# Patient Record
Sex: MALE | Race: WHITE | Employment: OTHER | URBAN - METROPOLITAN AREA
[De-identification: names, ages, dates, MRNs, and addresses within clinical notes are randomized per-mention and may not be internally consistent; named-entity substitution may affect disease eponyms.]

---

## 2017-03-07 RX ORDER — ZOLPIDEM TARTRATE 5 MG/1
5 TABLET ORAL
Qty: 30 TAB | Refills: 0 | Status: SHIPPED | OUTPATIENT
Start: 2017-03-07 | End: 2017-08-12 | Stop reason: SDUPTHER

## 2017-03-07 NOTE — TELEPHONE ENCOUNTER
From: Kia Miranda Sr  To: Abby Garcia MD  Sent: 3/7/2017 10:24 AM EST  Subject: Medication Renewal Request    Original authorizing provider: MD Kia Morales Sr would like a refill of the following medications:  zolpidem (AMBIEN) 5 mg tablet Abby Garcia MD]    Preferred pharmacy: [address]    Comment:

## 2017-03-08 ENCOUNTER — TELEPHONE (OUTPATIENT)
Dept: INTERNAL MEDICINE CLINIC | Age: 72
End: 2017-03-08

## 2017-03-08 NOTE — TELEPHONE ENCOUNTER
Chuy Kim from [address] [place] [address] needs a call back in ref to the script that was faxed the script has void all over

## 2017-08-10 RX ORDER — ATORVASTATIN CALCIUM 10 MG/1
TABLET, FILM COATED ORAL
Qty: 90 TAB | Refills: 1 | Status: SHIPPED | OUTPATIENT
Start: 2017-08-10 | End: 2018-02-06 | Stop reason: SDUPTHER

## 2017-08-14 RX ORDER — ZOLPIDEM TARTRATE 5 MG/1
5 TABLET ORAL
Qty: 30 TAB | Refills: 5 | Status: SHIPPED | OUTPATIENT
Start: 2017-08-14 | End: 2018-04-04 | Stop reason: SDUPTHER

## 2017-08-14 NOTE — TELEPHONE ENCOUNTER
From: Sebastien Bates Sr  To: Lebron Callas, MD  Sent: 8/12/2017 4:24 PM EDT  Subject: Medication Renewal Request    Original authorizing provider: Lebron Callas, MD    [facility].  Shyam Andres would like a refill of the following medications:  zolpidem (AMBIEN) 5 mg tablet Lebron Callas, MD]    Preferred pharmacy: [address], [address] [place] [address]    Comment:

## 2017-08-22 ENCOUNTER — TELEPHONE (OUTPATIENT)
Dept: INTERNAL MEDICINE CLINIC | Age: 72
End: 2017-08-22

## 2017-08-23 ENCOUNTER — TELEPHONE (OUTPATIENT)
Dept: INTERNAL MEDICINE CLINIC | Age: 72
End: 2017-08-23

## 2017-08-23 NOTE — TELEPHONE ENCOUNTER
Writer has received approval for [facility] and fax to Eve. Approval dates: 6/23/17 to 8/22/18.

## 2017-08-30 ENCOUNTER — HOSPITAL ENCOUNTER (OUTPATIENT)
Dept: LAB | Age: 72
Discharge: HOME | End: 2017-08-30
Payer: MEDICARE

## 2017-08-30 ENCOUNTER — OFFICE VISIT (OUTPATIENT)
Dept: INTERNAL MEDICINE CLINIC | Age: 72
End: 2017-08-30

## 2017-08-30 VITALS
WEIGHT: 169 LBS | TEMPERATURE: 97 F | OXYGEN SATURATION: 98 % | BODY MASS INDEX: 21.69 KG/M2 | HEIGHT: 74 IN | SYSTOLIC BLOOD PRESSURE: 132 MMHG | HEART RATE: 64 BPM | RESPIRATION RATE: 16 BRPM | DIASTOLIC BLOOD PRESSURE: 82 MMHG
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
## 2017-08-30 PROCEDURE — 36415 COLL VENOUS BLD VENIPUNCTURE: CPT

## 2017-08-30 PROCEDURE — 85025 COMPLETE CBC W/AUTO DIFF WBC: CPT

## 2017-08-30 PROCEDURE — 86618 LYME DISEASE ANTIBODY: CPT

## 2017-08-30 NOTE — PROGRESS NOTES
Chief Complaint   Patient presents with    Insect Bite     x 1 month tick bite, feeling weakness and dizziness, fatigue       Had bite right lat ankle 4 weeks ago never saw tick was in [place] had been in [place] the week before no bites then gricelda has resolved  No rash  Started malaise 2 weeks ago   No flu symptoms  Sleep erratic but has been for years  Does snore some    Patient Active Problem List    Diagnosis    Prehypertension    Dyslipidemia, goal LDL below 100     Off statin since June 2014.l        Schwannoma of spinal cord ([facility] [address]     resected  t11      Anxiety associated with depression    Prostate cancer [facility])     prostatectomy 9/2011      PSA low  Cancer free  At 2015      Incidental lung nodule, > 3mm and < 8mm     4 mm    Repeat CT 2012      Pulmonary nodule     f/u ct 6 months      Anxiety     Anxious  Vitals:    08/30/17 1024 08/30/17 1059   BP: 144/80 132/82   Pulse: 64    Resp: 16    Temp: 97 °F (36.1 °C)    TempSrc: Oral    SpO2: 98%    Weight: 169 lb (76.7 kg)    Height: 6' 2\" (1.88 m)      no apparent distress  Right ankle bite gricelda is almost gone        Most likely no issue  Since 4 weeks and had armani on [place] the week before will do lyme and cbc  obed a p[ossibility      Diagnoses and all orders for this visit:    1.  Tick bite, initial encounter  -     LYME AB TOTAL W/RFLX W BLOT  -     CBC WITH AUTOMATED DIFF      He will call 2 weeks with status

## 2017-08-30 NOTE — PROGRESS NOTES
Coordination of Care Questions    1. Have you been to the ER, urgent care clinic since your last visit? No       Hospitalized since your last visit? No    2. Have you seen or consulted any other health care providers outside of the [address] [place] [address] since your last visit? Include any pap smears or colon screening.  Yes Opthalmologic, yearly exam, August

## 2017-08-31 LAB
B BURGDOR IGG+IGM SER-ACNC: <0.91 ISR (ref 0–0.9)
BASOPHILS # BLD AUTO: 0.1 X10E3/UL (ref 0–0.2)
BASOPHILS NFR BLD AUTO: 1 %
EOSINOPHIL # BLD AUTO: 0.3 X10E3/UL (ref 0–0.4)
EOSINOPHIL NFR BLD AUTO: 4 %
ERYTHROCYTE [DISTWIDTH] IN BLOOD BY AUTOMATED COUNT: 13.3 % (ref 12.3–15.4)
HCT VFR BLD AUTO: 46.1 % (ref 37.5–51)
HGB BLD-MCNC: 15.5 G/DL (ref 12.6–17.7)
IMM GRANULOCYTES # BLD: 0 X10E3/UL (ref 0–0.1)
IMM GRANULOCYTES NFR BLD: 0 %
LYMPHOCYTES # BLD AUTO: 2 X10E3/UL (ref 0.7–3.1)
LYMPHOCYTES NFR BLD AUTO: 24 %
MCH RBC QN AUTO: 32.8 PG (ref 26.6–33)
MCHC RBC AUTO-ENTMCNC: 33.6 G/DL (ref 31.5–35.7)
MCV RBC AUTO: 98 FL (ref 79–97)
MONOCYTES # BLD AUTO: 0.7 X10E3/UL (ref 0.1–0.9)
MONOCYTES NFR BLD AUTO: 8 %
NEUTROPHILS # BLD AUTO: 5.2 X10E3/UL (ref 1.4–7)
NEUTROPHILS NFR BLD AUTO: 63 %
PLATELET # BLD AUTO: 343 X10E3/UL (ref 150–379)
RBC # BLD AUTO: 4.73 X10E6/UL (ref 4.14–5.8)
WBC # BLD AUTO: 8.3 X10E3/UL (ref 3.4–10.8)

## 2017-11-29 ENCOUNTER — OFFICE VISIT (OUTPATIENT)
Dept: INTERNAL MEDICINE CLINIC | Age: 72
End: 2017-11-29

## 2017-11-29 VITALS
HEART RATE: 69 BPM | WEIGHT: 165 LBS | OXYGEN SATURATION: 99 % | SYSTOLIC BLOOD PRESSURE: 140 MMHG | HEIGHT: 74 IN | DIASTOLIC BLOOD PRESSURE: 84 MMHG | BODY MASS INDEX: 21.17 KG/M2 | TEMPERATURE: 98 F | RESPIRATION RATE: 14 BRPM

## 2017-11-29 DIAGNOSIS — I10 ESSENTIAL HYPERTENSION: ICD-10-CM

## 2017-11-29 DIAGNOSIS — Z00.00 MEDICARE ANNUAL WELLNESS VISIT, SUBSEQUENT: ICD-10-CM

## 2017-11-29 DIAGNOSIS — E78.5 DYSLIPIDEMIA, GOAL LDL BELOW 100: ICD-10-CM

## 2017-11-29 DIAGNOSIS — M70.61 TROCHANTERIC BURSITIS OF RIGHT HIP: Primary | ICD-10-CM

## 2017-11-29 DIAGNOSIS — C61 PROSTATE CANCER (HCC): ICD-10-CM

## 2017-11-29 DIAGNOSIS — Z13.39 SCREENING FOR ALCOHOLISM: ICD-10-CM

## 2017-11-29 DIAGNOSIS — R53.82 CHRONIC FATIGUE: ICD-10-CM

## 2017-11-29 RX ORDER — CHOLECALCIFEROL (VITAMIN D3) 125 MCG
200 CAPSULE ORAL DAILY
COMMUNITY

## 2017-11-29 RX ORDER — GUAIFENESIN 100 MG/5ML
81 LIQUID (ML) ORAL DAILY
Qty: 100 TAB | Refills: 11
Start: 2017-11-29 | End: 2020-12-07 | Stop reason: ALTCHOICE

## 2017-11-29 RX ORDER — LISINOPRIL 10 MG/1
10 TABLET ORAL DAILY
Qty: 90 TAB | Refills: 1 | Status: SHIPPED | OUTPATIENT
Start: 2017-11-29 | End: 2019-11-19 | Stop reason: ALTCHOICE

## 2017-11-29 NOTE — PROGRESS NOTES
Coordination of Care Questions    1. Have you been to the ER, urgent care clinic since your last visit? No       Hospitalized since your last visit? No    2. Have you seen or consulted any other health care providers outside of the [address] [place] [address] since your last visit? Include any pap smears or colon screening.  No

## 2017-11-29 NOTE — PATIENT INSTRUCTIONS
Medicare Wellness Visit, Male    The best way to live healthy is to have a healthy lifestyle by eating a well-balanced diet, exercising regularly, limiting alcohol and stopping smoking. Regular physical exams and screening tests are another way to keep healthy. Preventive exams provided by your health care provider can find health problems before they become diseases or illnesses. Preventive services including immunizations, screening tests, monitoring and exams can help you take care of your own health. All people over age 72 should have a pneumovax  and and a prevnar shot to prevent pneumonia. These are once in a lifetime unless you and your provider decide differently. All people over 65 should have a yearly flu shot and a tetanus vaccine every 10 years. Screening for diabetes mellitus with a blood sugar test should be done every year. Glaucoma is a disease of the eye due to increased ocular pressure that can lead to blindness and it should be done every year by an eye professional.    Cardiovascular screening tests that check for elevated lipids (fatty part of blood) which can lead to heart disease and strokes should be done every 5 years. Colorectal screening that evaluates for blood or polyps in your colon should be done yearly as a stool test or every five years as a flexible sigmoidoscope or every 10 years as a colonoscopy up to age 76. Men up to age 76 may need a screening blood test for prostate cancer at certain intervals, depending on their personal and family history. This decision is between the patient and his provider. If you have been a smoker or had family history of abdominal aortic aneurysms, you and your provider may decide to schedule an ultrasound test of your aorta. Hepatitis C screening is also recommended for anyone born between [birth year] through [address]. A shingles vaccine is also recommended once in a lifetime after age 61.     Your Medicare Wellness Exam is recommended annually. Here is a list of your current Health Maintenance items with a due date:  Health Maintenance Due   Topic Date Due    Annual Well Visit  11/29/2017          Hip Bursitis: Exercises  Your Care Instructions  Here are some examples of typical rehabilitation exercises for your condition. Start each exercise slowly. Ease off the exercise if you start to have pain. Your doctor or physical therapist will tell you when you can start these exercises and which ones will work best for you. How to do the exercises  Hip rotator stretch    1. Lie on your back with both knees bent and your feet flat on the floor. 2. Put the ankle of your affected leg on your opposite thigh near your knee. 3. Use your hand to gently push your knee away from your body until you feel a gentle stretch around your hip. 4. Hold the stretch for 15 to 30 seconds. 5. Repeat 2 to 4 times. 6. Repeat steps 1 through 5, but this time use your hand to gently pull your knee toward your opposite shoulder. Iliotibial band stretch    1. Lean sideways against a wall. If you are not steady on your feet, hold on to a chair or counter. 2. Stand on the leg with the affected hip, with that leg close to the wall. Then cross your other leg in front of it. 3. Let your affected hip drop out to the side of your body and against wall. Then lean away from your affected hip until you feel a stretch. 4. Hold the stretch for 15 to 30 seconds. 5. Repeat 2 to 4 times. Straight-leg raises to the outside    1. Lie on your side, with your affected hip on top. 2. Tighten the front thigh muscles of your top leg to keep your knee straight. 3. Keep your hip and your leg straight in line with the rest of your body, and keep your knee pointing forward. Do not drop your hip back. 4. Lift your top leg straight up toward the ceiling, about 12 inches off the floor. Hold for about 6 seconds, then slowly lower your leg.   5. Repeat 8 to 12 times.  Clamshell    1. Lie on your side, with your affected hip on top and your head propped on a pillow. Keep your feet and knees together and your knees bent. 2. Raise your top knee, but keep your feet together. Do not let your hips roll back. Your legs should open up like a clamshell. 3. Hold for 6 seconds. 4. Slowly lower your knee back down. Rest for 10 seconds. 5. Repeat 8 to 12 times. Follow-up care is a key part of your treatment and safety. Be sure to make and go to all appointments, and call your doctor if you are having problems. It's also a good idea to know your test results and keep a list of the medicines you take. Where can you learn more? Go to http://reuben-giovany.info/. Enter Q247 in the search box to learn more about \"Hip Bursitis: Exercises. \"  Current as of: March 21, 2017  Content Version: 11.4  © 2231-2216 Healthwise, Incorporated. Care instructions adapted under license by [employer] (which disclaims liability or warranty for this information). If you have questions about a medical condition or this instruction, always ask your healthcare professional. [place] [address] any warranty or liability for your use of this information.

## 2017-11-29 NOTE — MR AVS SNAPSHOT
Visit Information Date & Time Provider Department Dept. Phone Encounter #  
 11/29/2017  9:15 AM Daniel Barrientos MD [facility] [phone] [id number] Upcoming Health Maintenance Date Due  
 MEDICARE YEARLY EXAM 11/29/2017 COLONOSCOPY 7/1/2019 GLAUCOMA SCREENING Q2Y 8/18/2019 DTaP/Tdap/Td series (2 - Td) 9/14/2020 Allergies as of 11/29/2017  Review Complete On: 11/29/2017 By: Daniel Barrientos MD  
  
 Severity Noted Reaction Type Reactions Iodinated Contrast- Oral And Iv Dye  09/19/2011    Nausea and Vomiting Iodine  09/19/2011    Nausea and Vomiting Morphine  09/26/2011    Hives Pcn [Penicillins]  09/14/2010    Hives Current Immunizations  Reviewed on 11/29/2017 Name Date Influenza High Dose Vaccine PF 11/9/2017, 11/3/2016, 10/27/2015 Influenza Vaccine 11/13/2013 Influenza Vaccine (Quad) PF 11/17/2014 Influenza Vaccine Split 11/1/2012 TDAP Vaccine 9/14/2010 ZZZ-RETIRED (DO NOT USE) Pneumococcal Vaccine (Unspecified Type) 11/1/2012, 1/2/2003 Zoster 1/2/2009 Reviewed by Stephanie Espinal LPN on 56/27/0870 at  9:37 AM  
You Were Diagnosed With   
  
 Codes Comments Trochanteric bursitis of right hip    -  Primary ICD-10-CM: M70.61 ICD-9-CM: 726.5 Medicare annual wellness visit, subsequent     ICD-10-CM: Z00.00 ICD-9-CM: V70.0 Screening for alcoholism     ICD-10-CM: Z13.89 ICD-9-CM: V79.1 Dyslipidemia, goal LDL below 100     ICD-10-CM: E78.5 ICD-9-CM: 272.4 Essential hypertension     ICD-10-CM: I10 
ICD-9-CM: 401.9 Prostate cancer [facility])     ICD-10-CM: Y31 ICD-9-CM: 880 Chronic fatigue     ICD-10-CM: R53.82 
ICD-9-CM: 780.79 Vitals BP Pulse Temp Resp Height(growth percentile) Weight(growth percentile) 140/84 69 98 °F (36.7 °C) (Oral) 14 6' 2\" (1.88 m) 165 lb (74.8 kg) SpO2 BMI Smoking Status 99% 21.18 kg/m2 Former Smoker Vitals History BMI and BSA Data Body Mass Index Body Surface Area  
 21.18 kg/m 2 1.98 m 2 Preferred Pharmacy Pharmacy Name Phone [facility] [address] [place] [address], [address] [phone] Your Updated Medication List  
  
   
This list is accurate as of: 11/29/17 10:09 AM.  Always use your most recent med list.  
  
  
  
  
 aspirin 81 mg chewable tablet Take 1 Tab by mouth daily. atorvastatin 10 mg tablet Commonly known as:  LIPITOR  
TAKE ONE TABLET BY MOUTH DAILY  
  
 CO Q-10 100 mg capsule Generic drug:  co-enzyme Q-10 Take 200 mg by mouth daily. COSOPT OP Apply  to eye.  
  
 lisinopril 10 mg tablet Commonly known as:  Robertha Roup Take 1 Tab by mouth daily. Indications: hypertension PRESERVISION LUTEIN 226 mg-200 unit -5 mg-0.8 mg Cap Generic drug:  Vit C-Vit E-Copper-ZnOx-Lutein Take  by mouth daily. zolpidem 5 mg tablet Commonly known as:  AMBIEN Take 1 Tab by mouth nightly as needed for Sleep. Max Daily Amount: 5 mg. Prescriptions Sent to Pharmacy Refills  
 lisinopril (PRINIVIL, ZESTRIL) 10 mg tablet 1 Sig: Take 1 Tab by mouth daily. Indications: hypertension Class: Normal  
 Pharmacy: [facility], [facility] #: [phone] Route: Oral  
  
We Performed the Following LIPID PANEL [32822 CPT(R)] METABOLIC PANEL, COMPREHENSIVE [43893 CPT(R)] AL ANNUAL ALCOHOL SCREEN 15 MIN [id number] [facility]] PSA, DIAGNOSTIC (PROSTATE SPECIFIC AG) [id number] CPT(R)] TSH 3RD GENERATION [80834 CPT(R)] Patient Instructions Medicare Wellness Visit, Male The best way to live healthy is to have a healthy lifestyle by eating a well-balanced diet, exercising regularly, limiting alcohol and stopping smoking. Regular physical exams and screening tests are another way to keep healthy.   
Preventive exams provided by your health care provider can find health problems before they become diseases or illnesses. Preventive services including immunizations, screening tests, monitoring and exams can help you take care of your own health. All people over age 72 should have a pneumovax  and and a prevnar shot to prevent pneumonia. These are once in a lifetime unless you and your provider decide differently. All people over 65 should have a yearly flu shot and a tetanus vaccine every 10 years. Screening for diabetes mellitus with a blood sugar test should be done every year. Glaucoma is a disease of the eye due to increased ocular pressure that can lead to blindness and it should be done every year by an eye professional. 
 
Cardiovascular screening tests that check for elevated lipids (fatty part of blood) which can lead to heart disease and strokes should be done every 5 years. Colorectal screening that evaluates for blood or polyps in your colon should be done yearly as a stool test or every five years as a flexible sigmoidoscope or every 10 years as a colonoscopy up to age 76. Men up to age 76 may need a screening blood test for prostate cancer at certain intervals, depending on their personal and family history. This decision is between the patient and his provider. If you have been a smoker or had family history of abdominal aortic aneurysms, you and your provider may decide to schedule an ultrasound test of your aorta. Hepatitis C screening is also recommended for anyone born between [birth year] through [address]. A shingles vaccine is also recommended once in a lifetime after age 61. Your Medicare Wellness Exam is recommended annually. Here is a list of your current Health Maintenance items with a due date: 
Health Maintenance Due Topic Date Due  
 Annual Well Visit  11/29/2017 Hip Bursitis: Exercises Your Care Instructions Here are some examples of typical rehabilitation exercises for your condition. Start each exercise slowly. Ease off the exercise if you start to have pain. Your doctor or physical therapist will tell you when you can start these exercises and which ones will work best for you. How to do the exercises Hip rotator stretch 1. Lie on your back with both knees bent and your feet flat on the floor. 2. Put the ankle of your affected leg on your opposite thigh near your knee. 3. Use your hand to gently push your knee away from your body until you feel a gentle stretch around your hip. 4. Hold the stretch for 15 to 30 seconds. 5. Repeat 2 to 4 times. 6. Repeat steps 1 through 5, but this time use your hand to gently pull your knee toward your opposite shoulder. Iliotibial band stretch 1. Lean sideways against a wall. If you are not steady on your feet, hold on to a chair or counter. 2. Stand on the leg with the affected hip, with that leg close to the wall. Then cross your other leg in front of it. 3. Let your affected hip drop out to the side of your body and against wall. Then lean away from your affected hip until you feel a stretch. 4. Hold the stretch for 15 to 30 seconds. 5. Repeat 2 to 4 times. Straight-leg raises to the outside 1. Lie on your side, with your affected hip on top. 2. Tighten the front thigh muscles of your top leg to keep your knee straight. 3. Keep your hip and your leg straight in line with the rest of your body, and keep your knee pointing forward. Do not drop your hip back. 4. Lift your top leg straight up toward the ceiling, about 12 inches off the floor. Hold for about 6 seconds, then slowly lower your leg. 5. Repeat 8 to 12 times. Clamshell 1. Lie on your side, with your affected hip on top and your head propped on a pillow. Keep your feet and knees together and your knees bent. 2. Raise your top knee, but keep your feet together. Do not let your hips roll back. Your legs should open up like a clamshell. 3. Hold for 6 seconds. 4. Slowly lower your knee back down. Rest for 10 seconds. 5. Repeat 8 to 12 times. Follow-up care is a key part of your treatment and safety. Be sure to make and go to all appointments, and call your doctor if you are having problems. It's also a good idea to know your test results and keep a list of the medicines you take. Where can you learn more? Go to http://reuben-giovany.info/. Enter Z445 in the search box to learn more about \"Hip Bursitis: Exercises. \" Current as of: March 21, 2017 Content Version: 11.4 © 5216-4956 [employer]. Care instructions adapted under license by [employer] (which disclaims liability or warranty for this information). If you have questions about a medical condition or this instruction, always ask your healthcare professional. [address] any warranty or liability for your use of this information. Introducing [facility]! Dear Antonieta Batista: Thank you for requesting a [employer] account. Our records indicate that you already have an active [employer] account. You can access your account anytime at [email or web address] Did you know that you can access your hospital and ER discharge instructions at any time in [employer]? You can also review all of your test results from your hospital stay or ER visit. Additional Information If you have questions, please visit the Frequently Asked Questions section of the [employer] website at [email or web address] Remember, [employer] is NOT to be used for urgent needs. For medical emergencies, dial 911. Now available from your iPhone and Android! Please provide this summary of care documentation to your next provider. Your primary care clinician is listed as Neel Flores. If you have any questions after today's visit, please call [phone].

## 2017-11-29 NOTE — PROGRESS NOTES
This is a Subsequent Medicare Annual Wellness Exam (AWV) (Performed 12 months after IPPE or effective date of Medicare Part B enrollment)    I have reviewed the patient's medical history in detail and updated the computerized patient record. History     Past Medical History:   Diagnosis Date    Anxiety associated with depression     Depression     GERD (gastroesophageal reflux disease)     Glaucoma     w/uveitis & detached retina    Incidental lung nodule, > 3mm and < 8mm 6/14/2011    Optic neuritis, left     Prostate cancer [facility])     prostatectomy 9/2011    Schwannoma of spinal cord ([facility] [address] 11/1/2012      Past Surgical History:   Procedure Laterality Date    ABDOMEN SURGERY PROC UNLISTED  2003    SPLENECTOMY    ENDOSCOPY, COLON, DIAGNOSTIC  2009    HX CATARACT REMOVAL      BILATERAL    HX GI  2003    splenectomy    HX HEENT  2009    DETACHED RETINA 2X LEFT EYE    HX HEENT  2009    CORNEA TRANSPLANT LEFT EYE    HX HERNIA REPAIR  1970, 1994    BILATERAL    HX HERNIA REPAIR  9-    Diag. Lap. and repair of incisional hernia with mesh    HX PROSTATECTOMY      HX UROLOGICAL  2011    robotic prostatectomy    MS LAP,PROSTATECTOMY,RADICAL,W/NERVE SPARE,INCL ROBOTIC  9/2011    REPAIR INCISIONAL HERNIA,REDUCIBLE  09/25/11    diagnostic lap. wih mesh     Current Outpatient Prescriptions   Medication Sig Dispense Refill    co-enzyme Q-10 (CO Q-10) 100 mg capsule Take 200 mg by mouth daily.  zolpidem (AMBIEN) 5 mg tablet Take 1 Tab by mouth nightly as needed for Sleep. Max Daily Amount: 5 mg. 30 Tab 5    atorvastatin (LIPITOR) 10 mg tablet TAKE ONE TABLET BY MOUTH DAILY 90 Tab 1    DORZOLAMIDE HCL/TIMOLOL MALEAT (COSOPT OP) Apply  to eye.  Vit C-Vit E-Copper-ZnOx-Lutein (PRESERVISION) 226-200-5 mg-unit-mg Cap Take  by mouth daily.        Allergies   Allergen Reactions    Iodinated Contrast- Oral And Iv Dye Nausea and Vomiting    Iodine Nausea and Vomiting    Morphine Hives    Pcn [Penicillins] Hives     Family History   Problem Relation Age of Onset    Liver Disease Mother     Heart Disease Father 68    Elevated Lipids Father     Cancer Sister      Social History   Substance Use Topics    Smoking status: Former Smoker     Years: 8.00     Quit date: 9/14/1975    Smokeless tobacco: Never Used    Alcohol use Yes      Comment: RARE     Patient Active Problem List   Diagnosis Code    Anxiety F41.9    Pulmonary nodule R91.1    Incidental lung nodule, > 3mm and < 8mm R91.1    Anxiety associated with depression F41.8    Prostate cancer ([facility] [address] C61    Schwannoma of spinal cord ([facility] [address] D33.4    Dyslipidemia, goal LDL below 100 E78.5    Prehypertension R03.0       Depression Risk Factor Screening:   No flowsheet data found. Alcohol Risk Factor Screening:   Not reviewed    Functional Ability and Level of Safety:   Hearing Loss  The patient wears hearing aids. Activities of Daily Living  The home contains: no safety equipment. Patient does total self care    Fall RiskFall Risk Assessment, last 12 mths 11/29/2017   Able to walk? Yes   Fall in past 12 months? No       Abuse Screen  Patient is not abused    Cognitive Screening   Evaluation of Cognitive Function:  Has your family/caregiver stated any concerns about your memory: no  Normal    Patient Care Team   Patient Care Team:  Gretta Bloch, MD as PCP - General    Assessment/Plan   Education and counseling provided:  Are appropriate based on today's review and evaluation  End-of-Life planning (with patient's consent)    Diagnoses and all orders for this visit:    1. Medicare annual wellness visit, subsequent    2. Screening for alcoholism  -     Annual  Alcohol Screen 15 min ()      Health Maintenance Due   Topic Date Due    MEDICARE YEARLY EXAM  11/29/2017     SUBJECTIVE: Sebastian West  is a 81 y.o. male seen for a follow up visit; he has hypertension, hyperlipidemia and no known cardiovascular conditions.   Current Outpatient Prescriptions   Medication Sig Dispense Refill    co-enzyme Q-10 (CO Q-10) 100 mg capsule Take 200 mg by mouth daily.  aspirin 81 mg chewable tablet Take 1 Tab by mouth daily. 100 Tab 11    lisinopril (PRINIVIL, ZESTRIL) 10 mg tablet Take 1 Tab by mouth daily. Indications: hypertension 90 Tab 1    zolpidem (AMBIEN) 5 mg tablet Take 1 Tab by mouth nightly as needed for Sleep. Max Daily Amount: 5 mg. 30 Tab 5    atorvastatin (LIPITOR) 10 mg tablet TAKE ONE TABLET BY MOUTH DAILY 90 Tab 1    DORZOLAMIDE HCL/TIMOLOL MALEAT (COSOPT OP) Apply  to eye.  Vit C-Vit E-Copper-ZnOx-Lutein (PRESERVISION) 226-200-5 mg-unit-mg Cap Take  by mouth daily. Patient Active Problem List   Diagnosis Code    Anxiety F41.9    Pulmonary nodule R91.1    Incidental lung nodule, > 3mm and < 8mm R91.1    Anxiety associated with depression F41.8    Prostate cancer ([facility] [address] C61    Schwannoma of spinal cord ([facility] [address] D33.4    Dyslipidemia, goal LDL below 100 E78.5    Prehypertension R03.0     System Review: Cardiovascular ROS - taking medications as instructed, no medication side effects noted, patient does not perform home BP monitoring, no TIA's, no chest pain on exertion, no dyspnea on exertion, no swelling of ankles, no palpitations. New concerns: dad  CAD age 76. Vitals:    17 0931 17 0950   BP: 138/89 140/84   Pulse: 69    Resp: 14    Temp: 98 °F (36.7 °C)    TempSrc: Oral    SpO2: 99%    Weight: 165 lb (74.8 kg)    Height: 6' 2\" (1.88 m)        OBJECTIVE:  Visit Vitals    /84    Pulse 69    Temp 98 °F (36.7 °C) (Oral)    Resp 14    Ht 6' 2\" (1.88 m)    Wt 165 lb (74.8 kg)    SpO2 99%    BMI 21.18 kg/m2      Appearance: alert, well appearing, and in no distress and oriented to person, place, and time. General exam: CVS exam BP noted to be mildly elevated today in office, S1, S2 normal, no gallop, no murmur, chest clear, no JVD, no HSM, no edema.   Lab review: most recent lipid panel reviewed, showing LDL result does not yet meet goal.     ASSESSMENT:  hypertension needs further observation, needs improvement, begin ace, hyperlipidemia borderline controlled, labs on statin for a year. PLAN:  reviewed diet, exercise and weight control  cardiovascular risk and specific lipid/LDL goals reviewed  reviewed medications and side effects in detail  use of aspirin to prevent MI and TIA's discussed. mediterranean diet advise as a sensible approach to   Heart health with exercise for 45 minutes daily]  Advance Care Planning (ACP) Provider Conversation Snapshot    Date of ACP Conversation: 11/29/17  Persons included in Conversation:  patient  Length of ACP Conversation in minutes:  <16 minutes (Non-Billable)    Authorized Decision Maker (if patient is incapable of making informed decisions): This person is: Other Legally Authorized Decision Maker (e.g. Next of Kin)          For Patients with Decision Making Capacity:   Values/Goals: Exploration of values, goals, and preferences if recovery is not expected, even with continued medical treatment in the event of:  Imminent death    Conversation Outcomes / Follow-Up Plan:   Recommended completion of Advance Directive form after review of ACP materials and conversation with prospective healthcare agent   Recommended communicating the plan and making copies for the healthcare agent, personal physician, and others as appropriate (e.g., health system)  Recommended review of completed ACP document annually or upon change in health status    1. Medicare annual wellness visit, subsequent  Diet exercise    2. Screening for alcoholism        3. Trochanteric bursitis of right hip  occ a problem stretching    4. Dyslipidemia, goal LDL below 100  On statin    5. Essential hypertension  Needs improvement  - LIPID PANEL  - METABOLIC PANEL, COMPREHENSIVE    6. Prostate cancer ([facility] [address]  Old no longer seeing   - PROSTATE SPECIFIC AG    7.  Chronic fatigue  No change from before  - TSH 3RD GENERATION

## 2017-12-05 ENCOUNTER — HOSPITAL ENCOUNTER (OUTPATIENT)
Dept: LAB | Age: 72
Discharge: HOME | End: 2017-12-05
Payer: MEDICARE

## 2017-12-05 PROCEDURE — 84443 ASSAY THYROID STIM HORMONE: CPT

## 2017-12-05 PROCEDURE — 80053 COMPREHEN METABOLIC PANEL: CPT

## 2017-12-05 PROCEDURE — 80061 LIPID PANEL: CPT

## 2017-12-05 PROCEDURE — 36415 COLL VENOUS BLD VENIPUNCTURE: CPT

## 2017-12-05 PROCEDURE — 84153 ASSAY OF PSA TOTAL: CPT

## 2017-12-06 LAB
ALBUMIN SERPL-MCNC: 4.2 G/DL (ref 3.5–4.8)
ALBUMIN/GLOB SERPL: 1.5 {RATIO} (ref 1.2–2.2)
ALP SERPL-CCNC: 85 IU/L (ref 39–117)
ALT SERPL-CCNC: 19 IU/L (ref 0–44)
AST SERPL-CCNC: 23 IU/L (ref 0–40)
BILIRUB SERPL-MCNC: 0.8 MG/DL (ref 0–1.2)
BUN SERPL-MCNC: 19 MG/DL (ref 8–27)
BUN/CREAT SERPL: 20 (ref 10–24)
CALCIUM SERPL-MCNC: 9.3 MG/DL (ref 8.6–10.2)
CHLORIDE SERPL-SCNC: 100 MMOL/L (ref 96–106)
CHOLEST SERPL-MCNC: 161 MG/DL (ref 100–199)
CO2 SERPL-SCNC: 27 MMOL/L (ref 18–29)
CREAT SERPL-MCNC: 0.94 MG/DL (ref 0.76–1.27)
GFR SERPLBLD CREATININE-BSD FMLA CKD-EPI: 82 ML/MIN/1.73
GFR SERPLBLD CREATININE-BSD FMLA CKD-EPI: 95 ML/MIN/1.73
GLOBULIN SER CALC-MCNC: 2.8 G/DL (ref 1.5–4.5)
GLUCOSE SERPL-MCNC: 94 MG/DL (ref 65–99)
HDLC SERPL-MCNC: 63 MG/DL
INTERPRETATION, 910389: NORMAL
LDLC SERPL CALC-MCNC: 77 MG/DL (ref 0–99)
POTASSIUM SERPL-SCNC: 4.8 MMOL/L (ref 3.5–5.2)
PROT SERPL-MCNC: 7 G/DL (ref 6–8.5)
PSA SERPL-MCNC: <0.1 NG/ML (ref 0–4)
SODIUM SERPL-SCNC: 140 MMOL/L (ref 134–144)
TRIGL SERPL-MCNC: 103 MG/DL (ref 0–149)
TSH SERPL DL<=0.005 MIU/L-ACNC: 1.15 UIU/ML (ref 0.45–4.5)
VLDLC SERPL CALC-MCNC: 21 MG/DL (ref 5–40)

## 2018-01-08 ENCOUNTER — OFFICE VISIT (OUTPATIENT)
Dept: INTERNAL MEDICINE CLINIC | Age: 73
End: 2018-01-08

## 2018-01-08 VITALS
DIASTOLIC BLOOD PRESSURE: 80 MMHG | TEMPERATURE: 97.9 F | WEIGHT: 166 LBS | HEART RATE: 82 BPM | HEIGHT: 74 IN | SYSTOLIC BLOOD PRESSURE: 138 MMHG | RESPIRATION RATE: 16 BRPM | BODY MASS INDEX: 21.3 KG/M2

## 2018-01-08 DIAGNOSIS — R91.1 NODULE OF RIGHT LUNG: Primary | ICD-10-CM

## 2018-01-08 DIAGNOSIS — J20.8 VIRAL BRONCHITIS: ICD-10-CM

## 2018-01-08 NOTE — PROGRESS NOTES
Chief Complaint   Patient presents with    Follow-up     pt was seen at urgent care for poss bronchitis pt was azithromycin and benzonatate     Seen 1/3/18 notes reviewed chest xray clear 4 mm nodule RUL  Feeling much better    Outside records reviewed  CT Results (most recent):    Results from [place] encounter on 05/16/12   CT CHEST W CONT   Narrative **Final Report**      ICD Codes / Adm. Diagnosis: 724.4   /     Examination:  CT Reese WEBB  - [id number] - May 16 2012  8:59AM  Accession No:  [id number]  Reason:  LUNG NODULE      REPORT:  INDICATION:   Followup lung nodule 793.11    COMPARISON: CT abdomen [facility] 01/28/2011    TECHNIQUE:   Multislice helical CT was performed from the thoracic inlet to the adrenal   glands during uneventful rapid bolus intravenous administration of 100 mL of   Optiray 320. Contiguous 5 mm axial images were reconstructed and lung and   soft tissue windows were generated. Coronal and sagittal reformations were   generated. FINDINGS:  The 5-6 mm nodule in the anterior medial basal segment of the left lower   lobe is again demonstrated and has not changed. It is slightly elliptical with linear stranding towards the pleural surface. No other pulmonary nodules, infiltrates or other parenchymal lung   abnormalities. No hilar, mediastinal or axillary adenopathy. Imaging through the upper abdomen has been remarkable. IMPRESSION:   1. No interval change in the 5-6 mm left lower lobe nodule since January 2011.  2. By the recommendations below if this is a low risk patient no further   followup is necessary. RECOMMENDATIONS FOR FOLLOW-UP AND MANAGEMENT OF NODULES SMALLER THAN 8 MM   DETECTED INCIDENTALLY AT NONSCREENING CT:    LOW-RISK PATIENTS:    LESS THAN OR EQUAL TO 4 MM:  No follow-up needed. GREATER THAN 4-6 MM:  Follow-up CT at 12 mo; if unchanged, no further   follow-up.   GREATER THAN 6-8 MM:  Initial follow-up CT at 6-12 months then at 18-24   months if no change. GREATER THAN 8 MM:  Follow-up CT at around 3, 9, and 24 months, dynamic   contrast-enhanced CT, PET, and/or biopsy. HIGH-RISK PATIENTS:    LESS THAN OR EQUAL TO 4 MM:  Follow-up CT at 12 months; if unchanged, no   further follow-up. GREATER THAN 4-6 MM:  Initial follow-up CT at 6-12 months then at 18-24   months if no change. GREATER THAN 6-8 MM:  Initial follow-up CT at 3-6 months then at 9-12 and 24   months if no change. GREATER THAN 8 MM:  Same as for low-risk patient. Guidelines for Management of Small Pulmonary Nodules Detected on CT Scans: A   Statement from the Fleischner Society\"  Sindy Flores[employer]iology 2005;   [phone]               Signing/Reading Doctor: Reji Sen ([id number])    Approved: Reji Sen ([id number])  05/21/2012                                    Vitals:    01/08/18 1104   BP: 138/80   Pulse: 82   Resp: 16   Temp: 97.9 °F (36.6 °C)   TempSrc: Oral   Weight: 166 lb (75.3 kg)   Height: 6' 2\" (1.88 m)     S1 and S2 normal, no murmurs, clicks, gallops or rubs. Regular rate and rhythm. Chest is clear; no wheezes or rales. No edema or JVD. no apparent distress        1. Nodule of right lung  4 mm had old CT left nodule  Will repeat xray 6 months    2.  Viral bronchitis  Getting better  will reassure

## 2018-02-06 RX ORDER — ATORVASTATIN CALCIUM 10 MG/1
TABLET, FILM COATED ORAL
Qty: 90 TAB | Refills: 0 | Status: SHIPPED | OUTPATIENT
Start: 2018-02-06 | End: 2020-07-18 | Stop reason: SDUPTHER

## 2018-03-21 ENCOUNTER — TELEPHONE (OUTPATIENT)
Dept: INTERNAL MEDICINE CLINIC | Age: 73
End: 2018-03-21

## 2018-03-21 RX ORDER — SERTRALINE HYDROCHLORIDE 50 MG/1
50 TABLET, FILM COATED ORAL DAILY
Qty: 90 TAB | Refills: 1 | Status: SHIPPED | OUTPATIENT
Start: 2018-03-21 | End: 2018-09-12 | Stop reason: SDUPTHER

## 2018-03-21 NOTE — TELEPHONE ENCOUNTER
Writer spoke with patient he states he was diagnosed with flu and was given Tamiflu, and on the first day of taking this medication he was experiencing mood changes and behavior and also a rash. Patient states he is taking his [place] as needed but he would like to restart his sertraline. Writer offered appointment but patient is currently in [place] till end of April. Patient has stopped course of Tamiflu as well.  Please advise

## 2018-04-04 DIAGNOSIS — G47.00 INSOMNIA, UNSPECIFIED TYPE: Primary | ICD-10-CM

## 2018-04-04 RX ORDER — ZOLPIDEM TARTRATE 5 MG/1
5 TABLET ORAL
Qty: 30 TAB | Refills: 2 | Status: SHIPPED | OUTPATIENT
Start: 2018-04-04 | End: 2018-04-18 | Stop reason: SDUPTHER

## 2018-04-04 NOTE — TELEPHONE ENCOUNTER
From: Aylin Devries Sr  To: Luciana Katz MD  Sent: 4/4/2018 12:21 PM EDT  Subject: Medication Renewal Request    Original authorizing provider: MD Garland [place]. [place] Patten would like a refill of the following medications:  zolpidem (AMBIEN) 5 mg tablet Luciana Katz MD]    Preferred pharmacy: [id number] [address]    Comment:  Could I have this prescription renewed? I'm in [place]. My pharmacy is [facility] [address], [place] [phone]. Fax [phone].  Thank you Blank Meza

## 2018-04-18 ENCOUNTER — OFFICE VISIT (OUTPATIENT)
Dept: INTERNAL MEDICINE CLINIC | Age: 73
End: 2018-04-18

## 2018-04-18 VITALS
HEIGHT: 74 IN | DIASTOLIC BLOOD PRESSURE: 68 MMHG | WEIGHT: 164.8 LBS | OXYGEN SATURATION: 97 % | TEMPERATURE: 98.5 F | RESPIRATION RATE: 18 BRPM | BODY MASS INDEX: 21.15 KG/M2 | HEART RATE: 69 BPM | SYSTOLIC BLOOD PRESSURE: 113 MMHG

## 2018-04-18 DIAGNOSIS — R91.1 INCIDENTAL LUNG NODULE: Primary | ICD-10-CM

## 2018-04-18 DIAGNOSIS — I70.0 AORTIC CALCIFICATION (HCC): ICD-10-CM

## 2018-04-18 DIAGNOSIS — G47.00 INSOMNIA, UNSPECIFIED TYPE: ICD-10-CM

## 2018-04-18 DIAGNOSIS — F41.8 ANXIETY ASSOCIATED WITH DEPRESSION: ICD-10-CM

## 2018-04-18 RX ORDER — ZOLPIDEM TARTRATE 5 MG/1
TABLET ORAL
Qty: 30 TAB | Refills: 4 | Status: SHIPPED | OUTPATIENT
Start: 2018-04-18 | End: 2018-11-18 | Stop reason: SDUPTHER

## 2018-04-18 RX ORDER — PANTOPRAZOLE SODIUM 20 MG/1
20 TABLET, DELAYED RELEASE ORAL DAILY
COMMUNITY
End: 2018-12-04 | Stop reason: ALTCHOICE

## 2018-04-18 NOTE — MR AVS SNAPSHOT
[address] [place] [address] [address] [facility] 
[phone] Patient: Ethyl Leaks 
MRN: [id number] :1945 Visit Information Date & Time Provider Department Dept. Phone Encounter #  
 2018  2:45 PM Silvia Ramírez, [address]19 [facility]  Upcoming Health Maintenance Date Due  
 MEDICARE YEARLY EXAM 2018 COLONOSCOPY 2019 GLAUCOMA SCREENING Q2Y 2019 DTaP/Tdap/Td series (2 - Td) 2020 Allergies as of 2018  Review Complete On: 2018 By: July Garcia Severity Noted Reaction Type Reactions Iodinated Contrast- Oral And Iv Dye  2011    Nausea and Vomiting Iodine  2011    Nausea and Vomiting Morphine  2011    Hives Pcn [Penicillins]  2010    Hives Tamiflu [Oseltamivir]  2018    Rash Current Immunizations  Reviewed on 2017 Name Date Influenza High Dose Vaccine PF 2017, 11/3/2016, 10/27/2015 Influenza Vaccine 2013 Influenza Vaccine (Quad) PF 2014 Influenza Vaccine Split 2012 TDAP Vaccine 2010 ZZZ-RETIRED (DO NOT USE) Pneumococcal Vaccine (Unspecified Type) 2012, 2003 Zoster 2009 Not reviewed this visit You Were Diagnosed With   
  
 Codes Comments Incidental lung nodule    -  Primary ICD-10-CM: R91.1 ICD-9-CM: 793.11 Vitals BP Pulse Temp Resp Height(growth percentile) Weight(growth percentile) 113/68 (BP 1 Location: Left arm, BP Patient Position: At rest) 69 98.5 °F (36.9 °C) (Oral) 18 6' 2\" (1.88 m) 164 lb 12.8 oz (74.8 kg) SpO2 BMI Smoking Status 97% 21.16 kg/m2 Former Smoker BMI and BSA Data Body Mass Index Body Surface Area  
 21.16 kg/m 2 1.98 m 2 Preferred Pharmacy Pharmacy Name Phone [address], [address] [place] [address] Tanner Spencer [phone] Your Updated Medication List  
  
   
This list is accurate as of 4/18/18  3:38 PM.  Always use your most recent med list.  
  
  
  
  
 aspirin 81 mg chewable tablet Take 1 Tab by mouth daily. atorvastatin 10 mg tablet Commonly known as:  LIPITOR  
TAKE ONE TABLET BY MOUTH DAILY  
  
 CO Q-10 100 mg capsule Generic drug:  co-enzyme Q-10 Take 200 mg by mouth daily. COSOPT OP Apply  to eye.  
  
 lisinopril 10 mg tablet Commonly known as:  [place] Tania Take 1 Tab by mouth daily. Indications: hypertension  
  
 pantoprazole 20 mg tablet Commonly known as:  PROTONIX Take 20 mg by mouth daily. PRESERVISION LUTEIN 226 mg-200 unit -5 mg-0.8 mg Cap Generic drug:  Vit C-Vit E-Copper-ZnOx-Lutein Take  by mouth daily. sertraline 50 mg tablet Commonly known as:  ZOLOFT Take 1 Tab by mouth daily. zolpidem 5 mg tablet Commonly known as:  AMBIEN Take 1 Tab by mouth nightly as needed for Sleep. Max Daily Amount: 5 mg. To-Do List   
 04/18/2018 Imaging:  CT CHEST WO CONT Introducing hospitals & HEALTH SERVICES! Dear Anabella Zayas: Thank you for requesting a [employer] account. Our records indicate that you already have an active [employer] account. You can access your account anytime at [email or web address] Did you know that you can access your hospital and ER discharge instructions at any time in [employer]? You can also review all of your test results from your hospital stay or ER visit. Additional Information If you have questions, please visit the Frequently Asked Questions section of the [employer] website at [email or web address] Remember, [employer] is NOT to be used for urgent needs. For medical emergencies, dial 911. Now available from your iPhone and Android! Please provide this summary of care documentation to your next provider. Your primary care clinician is listed as Yuliya Manjarrez. If you have any questions after today's visit, please call [phone].

## 2018-04-18 NOTE — PROGRESS NOTES
1. Have you been to the ER, urgent care clinic since your last visit? Hospitalized since your last visit?no        2. Have you seen or consulted any other health care providers outside of the [facility] since your last visit? Include any pap smears or colon screening.  No  Chief Complaint   Patient presents with    GERD     Not fasting

## 2018-04-18 NOTE — PROGRESS NOTES
Chief Complaint   Patient presents with    GERD     Had reaction to tamiflu  Seen 3/16  Had anxiety in [place] started sertraline 50, took prilosec then pantoprazole    Xray 1/2018 4 mm rul nodule on xray urgent care in [facility]    Subjective:  He was seen in [place] a couple weeks back with viral illness and they put him on Tamiflu. Did not test him. He had a reaction to Tamiflu, felt poorly, developed a rash, stopped it. He's started having anxiety over that. After he contacted me I reordered Sertraline 50 daily. He had previously been on 50 up to 100 daily, but this was a couple years ago. He is generally worried and anxious. He's been having GI upset and has appointment with gastroenterology tomorrow. He's been taking proton pump inhibitor Pantoprazole. He is worried about abnormal chest xray. Chest xray from the urgent care in [place] read nothing as a nodule and old xray from just a couple months ago in January that was at the urgent care center in [place] was read as a 4 mm nodule. He'd had previously nodule on an xray, had been followed by CT scan several years ago. He denies cough or hemoptysis. He's over the viral symptoms. Physical Examination:  His blood pressure was normal.  His chest was clear. S1, S2 regular. Abdomen soft. He was anxious. Plan:  1. I recommended continuing Sertraline for six weeks at the 50 mg dose. He'll contact me if not doing better. 2. I recommended the CT of the chest to further evaluate the nodule, although probably benign. 3. He has a GI appointment tomorrow and he'll discuss with them his GI issues. Vitals:    04/18/18 1456   BP: 113/68   Pulse: 69   Resp: 18   Temp: 98.5 °F (36.9 °C)   TempSrc: Oral   SpO2: 97%   Weight: 164 lb 12.8 oz (74.8 kg)   Height: 6' 2\" (1.88 m)     1. Incidental lung nodule  wrk up  - pantoprazole (PROTONIX) 20 mg tablet; Take 20 mg by mouth daily. - CT CHEST WO CONT; Future    2.  Aortic calcification (HCC)  Continue statin and 81 asa    3.  Anxiety associated with depression  zoloft

## 2018-04-25 ENCOUNTER — HOSPITAL ENCOUNTER (OUTPATIENT)
Dept: CT IMAGING | Age: 73
Discharge: HOME | End: 2018-04-25
Attending: INTERNAL MEDICINE
Payer: MEDICARE

## 2018-04-25 DIAGNOSIS — R91.1 INCIDENTAL LUNG NODULE: ICD-10-CM

## 2018-04-25 PROCEDURE — 71250 CT THORAX DX C-: CPT

## 2018-08-19 DIAGNOSIS — E78.5 DYSLIPIDEMIA, GOAL LDL BELOW 100: Primary | ICD-10-CM

## 2018-08-23 LAB
ALBUMIN SERPL-MCNC: 4.5 G/DL (ref 3.5–4.8)
ALBUMIN/GLOB SERPL: 2 {RATIO} (ref 1.2–2.2)
ALP SERPL-CCNC: 97 IU/L (ref 39–117)
ALT SERPL-CCNC: 19 IU/L (ref 0–44)
AST SERPL-CCNC: 26 IU/L (ref 0–40)
BILIRUB SERPL-MCNC: 0.6 MG/DL (ref 0–1.2)
BUN SERPL-MCNC: 17 MG/DL (ref 8–27)
BUN/CREAT SERPL: 18 (ref 10–24)
CALCIUM SERPL-MCNC: 9.2 MG/DL (ref 8.6–10.2)
CHLORIDE SERPL-SCNC: 100 MMOL/L (ref 96–106)
CHOLEST SERPL-MCNC: 150 MG/DL (ref 100–199)
CO2 SERPL-SCNC: 26 MMOL/L (ref 20–29)
CREAT SERPL-MCNC: 0.94 MG/DL (ref 0.76–1.27)
GLOBULIN SER CALC-MCNC: 2.3 G/DL (ref 1.5–4.5)
GLUCOSE SERPL-MCNC: 91 MG/DL (ref 65–99)
HDLC SERPL-MCNC: 65 MG/DL
INTERPRETATION, 910389: NORMAL
LDLC SERPL CALC-MCNC: 70 MG/DL (ref 0–99)
POTASSIUM SERPL-SCNC: 4.6 MMOL/L (ref 3.5–5.2)
PROT SERPL-MCNC: 6.8 G/DL (ref 6–8.5)
SODIUM SERPL-SCNC: 140 MMOL/L (ref 134–144)
TRIGL SERPL-MCNC: 77 MG/DL (ref 0–149)
VLDLC SERPL CALC-MCNC: 15 MG/DL (ref 5–40)

## 2018-09-12 RX ORDER — SERTRALINE HYDROCHLORIDE 50 MG/1
50 TABLET, FILM COATED ORAL DAILY
Qty: 90 TAB | Refills: 0 | Status: SHIPPED | OUTPATIENT
Start: 2018-09-12 | End: 2018-12-10 | Stop reason: SDUPTHER

## 2018-12-04 ENCOUNTER — HOSPITAL ENCOUNTER (OUTPATIENT)
Dept: LAB | Age: 73
Discharge: HOME | End: 2018-12-04
Payer: MEDICARE

## 2018-12-04 ENCOUNTER — OFFICE VISIT (OUTPATIENT)
Dept: INTERNAL MEDICINE CLINIC | Age: 73
End: 2018-12-04

## 2018-12-04 VITALS
HEART RATE: 63 BPM | BODY MASS INDEX: 21.56 KG/M2 | WEIGHT: 168 LBS | OXYGEN SATURATION: 99 % | DIASTOLIC BLOOD PRESSURE: 80 MMHG | SYSTOLIC BLOOD PRESSURE: 135 MMHG | HEIGHT: 74 IN | RESPIRATION RATE: 14 BRPM | TEMPERATURE: 96.7 F

## 2018-12-04 DIAGNOSIS — E78.5 DYSLIPIDEMIA, GOAL LDL BELOW 100: ICD-10-CM

## 2018-12-04 DIAGNOSIS — C61 PROSTATE CANCER (HCC): ICD-10-CM

## 2018-12-04 DIAGNOSIS — Z00.00 MEDICARE ANNUAL WELLNESS VISIT, SUBSEQUENT: ICD-10-CM

## 2018-12-04 DIAGNOSIS — I10 ESSENTIAL HYPERTENSION: ICD-10-CM

## 2018-12-04 DIAGNOSIS — Z13.39 SCREENING FOR ALCOHOLISM: ICD-10-CM

## 2018-12-04 DIAGNOSIS — R53.82 CHRONIC FATIGUE: ICD-10-CM

## 2018-12-04 DIAGNOSIS — S46.212A STRAIN OF LEFT BICEPS MUSCLE, INITIAL ENCOUNTER: Primary | ICD-10-CM

## 2018-12-04 PROBLEM — F32.A MILD DEPRESSION: Status: ACTIVE | Noted: 2018-12-04

## 2018-12-04 PROCEDURE — 84153 ASSAY OF PSA TOTAL: CPT

## 2018-12-04 PROCEDURE — 84443 ASSAY THYROID STIM HORMONE: CPT

## 2018-12-04 PROCEDURE — 85025 COMPLETE CBC W/AUTO DIFF WBC: CPT

## 2018-12-04 PROCEDURE — 80061 LIPID PANEL: CPT

## 2018-12-04 PROCEDURE — 36415 COLL VENOUS BLD VENIPUNCTURE: CPT

## 2018-12-04 PROCEDURE — 80053 COMPREHEN METABOLIC PANEL: CPT

## 2018-12-04 RX ORDER — THIAMINE HCL 100 MG
TABLET ORAL
COMMUNITY
End: 2022-07-18

## 2018-12-04 NOTE — PROGRESS NOTES
This is the Subsequent Medicare Annual Wellness Exam, performed 12 months or more after the Initial AWV or the last Subsequent AWV    I have reviewed the patient's medical history in detail and updated the computerized patient record. History     Past Medical History:   Diagnosis Date    Anxiety associated with depression     Depression     GERD (gastroesophageal reflux disease)     Glaucoma     w/uveitis & detached retina    Incidental lung nodule, > 3mm and < 8mm 6/14/2011    Optic neuritis, left     Prostate cancer [facility])     prostatectomy 9/2011    Schwannoma of spinal cord ([facility] [address] 11/1/2012      Past Surgical History:   Procedure Laterality Date    ABDOMEN SURGERY PROC UNLISTED  2003    SPLENECTOMY    ENDOSCOPY, COLON, DIAGNOSTIC  2009    HX CATARACT REMOVAL      BILATERAL    HX GI  2003    splenectomy    HX HEENT  2009    DETACHED RETINA 2X LEFT EYE    HX HEENT  2009    CORNEA TRANSPLANT LEFT EYE    HX HERNIA REPAIR  1970, 1994    BILATERAL    HX HERNIA REPAIR  9-    Diag. Lap. and repair of incisional hernia with mesh    HX PROSTATECTOMY      HX UROLOGICAL  2011    robotic prostatectomy    IN LAP,PROSTATECTOMY,RADICAL,W/NERVE SPARE,INCL ROBOTIC  9/2011    REPAIR INCISIONAL HERNIA,REDUCIBLE  09/25/11    diagnostic lap. wih mesh     Current Outpatient Medications   Medication Sig Dispense Refill    cyanocobalamin, vitamin B-12, 2,500 mcg lozg by SubLINGual route.  varicella-zoster recombinant, PF, (SHINGRIX, PF,) 50 mcg/0.5 mL susr injection 0.5mL by IntraMUSCular route once now and then repeat in 2-6 months 0.5 mL 1    sertraline (ZOLOFT) 50 mg tablet Take 1 Tab by mouth daily. 90 Tab 0    atorvastatin (LIPITOR) 10 mg tablet TAKE ONE TABLET BY MOUTH DAILY 90 Tab 0    co-enzyme Q-10 (CO Q-10) 100 mg capsule Take 200 mg by mouth daily.  aspirin 81 mg chewable tablet Take 1 Tab by mouth daily. 100 Tab 11    DORZOLAMIDE HCL/TIMOLOL MALEAT (COSOPT OP) Apply  to eye.  Vit C-Vit E-Copper-ZnOx-Lutein (PRESERVISION) 226-200-5 mg-unit-mg Cap Take  by mouth daily.  zolpidem (AMBIEN) 5 mg tablet TAKE ONE TABLET BY MOUTH EVERY NIGHT AS NEEDED FOR SLEEP 30 Tab 0    lisinopril (PRINIVIL, ZESTRIL) 10 mg tablet Take 1 Tab by mouth daily. Indications: hypertension 90 Tab 1     Allergies   Allergen Reactions    Iodinated Contrast- Oral And Iv Dye Nausea and Vomiting    Iodine Nausea and Vomiting    Morphine Hives    Pcn [Penicillins] Hives    Tamiflu [Oseltamivir] Rash     Family History   Problem Relation Age of Onset    Liver Disease Mother     Heart Disease Father 68    Elevated Lipids Father     Cancer Sister      Social History     Tobacco Use    Smoking status: Former Smoker     Years: 8.00     Last attempt to quit: 1975     Years since quittin.2    Smokeless tobacco: Never Used   Substance Use Topics    Alcohol use: Yes     Comment: RARE     Patient Active Problem List   Diagnosis Code    Anxiety F41.9    Pulmonary nodule R91.1    Incidental lung nodule, > 3mm and < 8mm R91.1    Anxiety associated with depression F41.8    Prostate cancer ([facility] [address] C61    Schwannoma of spinal cord ([facility] [address] D33.4    Dyslipidemia, goal LDL below 100 E78.5    Prehypertension R03.0    Aortic calcification (HCC) I70.0       Depression Risk Factor Screening:     PHQ over the last two weeks 2018   PHQ Not Done Active Diagnosis of Depression or Bipolar Disorder     Alcohol Risk Factor Screening: You do not drink alcohol or very rarely. Functional Ability and Level of Safety:   Hearing Loss  Hearing is good. Activities of Daily Living  The home contains: no safety equipment. Patient does total self care    Fall Risk  Fall Risk Assessment, last 12 mths 2018   Able to walk? Yes   Fall in past 12 months?  No       Abuse Screen  Patient is not abused    Cognitive Screening   Evaluation of Cognitive Function:  Has your family/caregiver stated any concerns about your memory: no  Normal    Patient Care Team   Patient Care Team:  Mercedes Ricardo MD as PCP - General    Assessment/Plan   Education and counseling provided:  Are appropriate based on today's review and evaluation  End-of-Life planning (with patient's consent)    Diagnoses and all orders for this visit:    1. Strain of left biceps muscle, initial encounter    2. Medicare annual wellness visit, subsequent    3. Dyslipidemia, goal LDL below 100  -     CBC WITH AUTOMATED DIFF  -     LIPID PANEL  -     METABOLIC PANEL, COMPREHENSIVE    4. Essential hypertension    5. Prostate cancer ([facility] [address]  -     PSA, DIAGNOSTIC (PROSTATE SPECIFIC AG)    6. Chronic fatigue  -     CBC WITH AUTOMATED DIFF  -     TSH 3RD GENERATION    7. Screening for alcoholism  -     UT ANNUAL ALCOHOL SCREEN 15 MIN  -     varicella-zoster recombinant, PF, (SHINGRIX, PF,) 50 mcg/0.5 mL susr injection; 0.5mL by IntraMUSCular route once now and then repeat in 2-6 months        Health Maintenance Due   Topic Date Due    Shingrix Vaccine Age 49> (1 of 2) 10/06/1997    Influenza Age 5 to Adult  08/01/2018    MEDICARE YEARLY EXAM  11/30/2018     . Bladimir Ramirez is a 72 y.o. male with the following Problems and Medications. Patient Active Problem List   Diagnosis Code    Anxiety F41.9    Pulmonary nodule R91.1    Incidental lung nodule, > 3mm and < 8mm R91.1    Anxiety associated with depression F41.8    Prostate cancer ([facility] [address] C61    Schwannoma of spinal cord ([facility] [address] D33.4    Dyslipidemia, goal LDL below 100 E78.5    Prehypertension R03.0    Aortic calcification (HCC) I70.0     Current Outpatient Medications   Medication Sig Dispense Refill    cyanocobalamin, vitamin B-12, 2,500 mcg lozg by SubLINGual route.  varicella-zoster recombinant, PF, (SHINGRIX, PF,) 50 mcg/0.5 mL susr injection 0.5mL by IntraMUSCular route once now and then repeat in 2-6 months 0.5 mL 1    sertraline (ZOLOFT) 50 mg tablet Take 1 Tab by mouth daily.  90 Tab 0    atorvastatin (LIPITOR) 10 mg tablet TAKE ONE TABLET BY MOUTH DAILY 90 Tab 0    co-enzyme Q-10 (CO Q-10) 100 mg capsule Take 200 mg by mouth daily.  aspirin 81 mg chewable tablet Take 1 Tab by mouth daily. 100 Tab 11    DORZOLAMIDE HCL/TIMOLOL MALEAT (COSOPT OP) Apply  to eye.  Vit C-Vit E-Copper-ZnOx-Lutein (PRESERVISION) 226-200-5 mg-unit-mg Cap Take  by mouth daily.  zolpidem (AMBIEN) 5 mg tablet TAKE ONE TABLET BY MOUTH EVERY NIGHT AS NEEDED FOR SLEEP 30 Tab 0    lisinopril (PRINIVIL, ZESTRIL) 10 mg tablet Take 1 Tab by mouth daily. Indications: hypertension 90 Tab 1     Needs PSA as not seeing   Wife wonders low T, explained it is low from prostaectomy   Not depressed  Anxiety ok  Home ADRIA < 130 usually not taking ace at all        Cardiovascular ROS: no chest pain or dyspnea on exertion  Neurological ROS: no TIA or stroke symptoms  General ROS: negative for - chills, fatigue, fever, malaise, night sweats or sleep disturbance  Endocrine ROS: negative for - hair pattern changes, hot flashes, malaise/lethargy, palpitations, polydipsia/polyuria, temperature intolerance or unexpected weight changes  Has tendon pain left elbow from lifting at gym        Vitals:    12/04/18 0930 12/04/18 0953   BP: 134/87 135/80   Pulse: 63    Resp: 14    Temp: 96.7 °F (35.9 °C)    TempSrc: Oral    SpO2: 99%    Weight: 168 lb (76.2 kg)    Height: 6' 2\" (1.88 m)      S1 and S2 normal, no murmurs, clicks, gallops or rubs. Regular rate and rhythm. Chest is clear; no wheezes or rales. No edema or JVD. The abdomen is soft without tenderness, guarding, mass, rebound or organomegaly. Bowel sounds are normal. No CVA tenderness or inguinal adenopathy noted. Left bicep tendon slight tender  Neuro OK  No prostate exam      1. Strain of left biceps muscle, initial encounter  Advise ice avoid nsaids and wait till better can try sleeve    2. Medicare annual wellness visit, subsequent      3.  Dyslipidemia, goal LDL below 100  At goal has ascvd noted on scan  - CBC WITH AUTOMATED DIFF  - LIPID PANEL  - METABOLIC PANEL, COMPREHENSIVE    4. Essential hypertension  Borderline try 5 mg lisinopril    5. Prostate cancer ([facility] [address]  No symptoms yeArly lab  - PSA, DIAGNOSTIC (PROSTATE SPECIFIC AG)    6.  Chronic fatigue  unchanged  - CBC WITH AUTOMATED DIFF  - TSH 3RD GENERATION    7. Screening for alcoholism  Rare   - DE ANNUAL ALCOHOL SCREEN 15 MIN  - varicella-zoster recombinant, PF, (SHINGRIX, PF,) 50 mcg/0.5 mL susr injection; 0.5mL by IntraMUSCular route once now and then repeat in 2-6 months  Dispense: 0.5 mL; Refill: 1

## 2018-12-04 NOTE — PROGRESS NOTES
1. Have you been to the ER, urgent care clinic since your last visit? Hospitalized since your last visit? No    2. Have you seen or consulted any other health care providers outside of the [facility] since your last visit? Include any pap smears or colon screening.  Had endoscopy but cant remember the provider

## 2018-12-04 NOTE — PATIENT INSTRUCTIONS
Medicare Wellness Visit, Male    The best way to live healthy is to have a lifestyle where you eat a well-balanced diet, exercise regularly, limit alcohol use, and quit all forms of tobacco/nicotine, if applicable. Regular preventive services are another way to keep healthy. Preventive services (vaccines, screening tests, monitoring & exams) can help personalize your care plan, which helps you manage your own care. Screening tests can find health problems at the earliest stages, when they are easiest to treat. [facility] follows the current, evidence-based guidelines published by the [facility] ([facility]) when recommending preventive services for our patients. Because we follow these guidelines, sometimes recommendations change over time as research supports it. (For example, a prostate screening blood test is no longer routinely recommended for men with no symptoms.)  Of course, you and your doctor may decide to screen more often for some diseases, based on your risk and co-morbidities (chronic disease you are already diagnosed with). Preventive services for you include:  - Medicare offers their members a free annual wellness visit, which is time for you and your primary care provider to discuss and plan for your preventive service needs. Take advantage of this benefit every year!  -All adults over age 72 should receive the recommended pneumonia vaccines. Current USPSTF guidelines recommend a series of two vaccines for the best pneumonia protection.   -All adults should have a flu vaccine yearly and an ECG.  All adults age 61 and older should receive a shingles vaccine once in their lifetime.    -All adults age 38-68 who are overweight should have a diabetes screening test once every three years.   -Other screening tests & preventive services for persons with diabetes include: an eye exam to screen for diabetic retinopathy, a kidney function test, a foot exam, and stricter control over your cholesterol.   -Cardiovascular screening for adults with routine risk involves an electrocardiogram (ECG) at intervals determined by the provider.   -Colorectal cancer screening should be done for adults age 54-65 with no increased risk factors for colorectal cancer. There are a number of acceptable methods of screening for this type of cancer. Each test has its own benefits and drawbacks. Discuss with your provider what is most appropriate for you during your annual wellness visit. The different tests include: colonoscopy (considered the best screening method), a fecal occult blood test, a fecal DNA test, and sigmoidoscopy.  -All adults born between [facility] should be screened once for Hepatitis C.  -An Abdominal Aortic Aneurysm (AAA) Screening is recommended for men age 73-68 who has ever smoked in their lifetime.      Here is a list of your current Health Maintenance items (your personalized list of preventive services) with a due date:  Health Maintenance Due   Topic Date Due    Shingles Vaccine (1 of 2) 10/06/1997    Flu Vaccine  08/01/2018    Annual Well Visit  11/30/2018

## 2018-12-05 LAB
ALBUMIN SERPL-MCNC: 4.2 G/DL (ref 3.5–4.8)
ALBUMIN/GLOB SERPL: 1.6 {RATIO} (ref 1.2–2.2)
ALP SERPL-CCNC: 99 IU/L (ref 39–117)
ALT SERPL-CCNC: 13 IU/L (ref 0–44)
AST SERPL-CCNC: 19 IU/L (ref 0–40)
BASOPHILS # BLD AUTO: 0.1 X10E3/UL (ref 0–0.2)
BASOPHILS NFR BLD AUTO: 2 %
BILIRUB SERPL-MCNC: 0.5 MG/DL (ref 0–1.2)
BUN SERPL-MCNC: 19 MG/DL (ref 8–27)
BUN/CREAT SERPL: 20 (ref 10–24)
CALCIUM SERPL-MCNC: 9.1 MG/DL (ref 8.6–10.2)
CHLORIDE SERPL-SCNC: 101 MMOL/L (ref 96–106)
CHOLEST SERPL-MCNC: 152 MG/DL (ref 100–199)
CO2 SERPL-SCNC: 28 MMOL/L (ref 20–29)
CREAT SERPL-MCNC: 0.94 MG/DL (ref 0.76–1.27)
EOSINOPHIL # BLD AUTO: 0.2 X10E3/UL (ref 0–0.4)
EOSINOPHIL NFR BLD AUTO: 3 %
ERYTHROCYTE [DISTWIDTH] IN BLOOD BY AUTOMATED COUNT: 13.1 % (ref 12.3–15.4)
GLOBULIN SER CALC-MCNC: 2.6 G/DL (ref 1.5–4.5)
GLUCOSE SERPL-MCNC: 91 MG/DL (ref 65–99)
HCT VFR BLD AUTO: 43.5 % (ref 37.5–51)
HDLC SERPL-MCNC: 54 MG/DL
HGB BLD-MCNC: 14.6 G/DL (ref 13–17.7)
IMM GRANULOCYTES # BLD: 0 X10E3/UL (ref 0–0.1)
IMM GRANULOCYTES NFR BLD: 0 %
INTERPRETATION, 910389: NORMAL
LDLC SERPL CALC-MCNC: 83 MG/DL (ref 0–99)
LYMPHOCYTES # BLD AUTO: 1.9 X10E3/UL (ref 0.7–3.1)
LYMPHOCYTES NFR BLD AUTO: 31 %
MCH RBC QN AUTO: 32.5 PG (ref 26.6–33)
MCHC RBC AUTO-ENTMCNC: 33.6 G/DL (ref 31.5–35.7)
MCV RBC AUTO: 97 FL (ref 79–97)
MONOCYTES # BLD AUTO: 0.6 X10E3/UL (ref 0.1–0.9)
MONOCYTES NFR BLD AUTO: 9 %
NEUTROPHILS # BLD AUTO: 3.3 X10E3/UL (ref 1.4–7)
NEUTROPHILS NFR BLD AUTO: 55 %
PLATELET # BLD AUTO: 301 X10E3/UL (ref 150–379)
POTASSIUM SERPL-SCNC: 4.7 MMOL/L (ref 3.5–5.2)
PROT SERPL-MCNC: 6.8 G/DL (ref 6–8.5)
PSA SERPL-MCNC: <0.1 NG/ML (ref 0–4)
RBC # BLD AUTO: 4.49 X10E6/UL (ref 4.14–5.8)
SODIUM SERPL-SCNC: 139 MMOL/L (ref 134–144)
TRIGL SERPL-MCNC: 76 MG/DL (ref 0–149)
TSH SERPL DL<=0.005 MIU/L-ACNC: 0.97 UIU/ML (ref 0.45–4.5)
VLDLC SERPL CALC-MCNC: 15 MG/DL (ref 5–40)
WBC # BLD AUTO: 6.1 X10E3/UL (ref 3.4–10.8)

## 2018-12-10 RX ORDER — SERTRALINE HYDROCHLORIDE 50 MG/1
TABLET, FILM COATED ORAL
Qty: 90 TAB | Refills: 1 | Status: SHIPPED | OUTPATIENT
Start: 2018-12-10 | End: 2019-11-19 | Stop reason: SDUPTHER

## 2019-01-04 ENCOUNTER — TELEPHONE (OUTPATIENT)
Dept: INTERNAL MEDICINE CLINIC | Age: 74
End: 2019-01-04

## 2019-04-23 DIAGNOSIS — G47.00 INSOMNIA, UNSPECIFIED TYPE: ICD-10-CM

## 2019-04-23 RX ORDER — ZOLPIDEM TARTRATE 5 MG/1
TABLET ORAL
Qty: 30 TAB | Refills: 0 | Status: SHIPPED | OUTPATIENT
Start: 2019-04-23 | End: 2019-12-19

## 2019-04-23 RX ORDER — SERTRALINE HYDROCHLORIDE 50 MG/1
TABLET, FILM COATED ORAL
Qty: 90 TAB | Refills: 0 | Status: SHIPPED | OUTPATIENT
Start: 2019-04-23 | End: 2019-06-08 | Stop reason: SDUPTHER

## 2019-05-20 ENCOUNTER — HOSPITAL ENCOUNTER (OUTPATIENT)
Dept: MRI IMAGING | Age: 74
Discharge: HOME | End: 2019-05-20

## 2019-05-20 DIAGNOSIS — T15.90XA FOREIGN BODY IN EYE: ICD-10-CM

## 2019-05-20 DIAGNOSIS — R22.31 MASS OF FINGER OF RIGHT HAND: ICD-10-CM

## 2019-06-07 ENCOUNTER — HOSPITAL ENCOUNTER (OUTPATIENT)
Dept: GENERAL RADIOLOGY | Age: 74
Discharge: HOME | End: 2019-06-07
Attending: ORTHOPAEDIC SURGERY
Payer: MEDICARE

## 2019-06-07 ENCOUNTER — HOSPITAL ENCOUNTER (OUTPATIENT)
Dept: MRI IMAGING | Age: 74
Discharge: HOME | End: 2019-06-07
Attending: ORTHOPAEDIC SURGERY
Payer: MEDICARE

## 2019-06-07 VITALS — BODY MASS INDEX: 21.31 KG/M2 | WEIGHT: 166 LBS

## 2019-06-07 DIAGNOSIS — T15.90XA FOREIGN BODY ACCIDENTALLY ENTERING EYE AND ADNEXA: ICD-10-CM

## 2019-06-07 PROCEDURE — A9575 INJ GADOTERATE MEGLUMI 0.1ML: HCPCS | Performed by: RADIOLOGY

## 2019-06-07 PROCEDURE — 70030 X-RAY EYE FOR FOREIGN BODY: CPT

## 2019-06-07 PROCEDURE — 73220 MRI UPPR EXTREMITY W/O&W/DYE: CPT

## 2019-06-07 PROCEDURE — 74011250636 HC RX REV CODE- 250/636: Performed by: RADIOLOGY

## 2019-06-07 RX ORDER — GADOTERATE MEGLUMINE 376.9 MG/ML
14 INJECTION INTRAVENOUS
Status: COMPLETED | OUTPATIENT
Start: 2019-06-07 | End: 2019-06-07

## 2019-06-07 RX ADMIN — GADOTERATE MEGLUMINE 14 ML: 376.9 INJECTION INTRAVENOUS at 10:09

## 2019-06-10 RX ORDER — SERTRALINE HYDROCHLORIDE 50 MG/1
TABLET, FILM COATED ORAL
Qty: 90 TAB | Refills: 0 | Status: SHIPPED | OUTPATIENT
Start: 2019-06-10 | End: 2019-12-19

## 2019-09-03 RX ORDER — ATORVASTATIN CALCIUM 10 MG/1
TABLET, FILM COATED ORAL
Qty: 90 TAB | Refills: 2 | Status: SHIPPED | OUTPATIENT
Start: 2019-09-03 | End: 2019-11-19 | Stop reason: SDUPTHER

## 2019-11-19 ENCOUNTER — OFFICE VISIT (OUTPATIENT)
Dept: INTERNAL MEDICINE CLINIC | Age: 74
End: 2019-11-19

## 2019-11-19 VITALS
WEIGHT: 175 LBS | BODY MASS INDEX: 22.46 KG/M2 | TEMPERATURE: 96.2 F | HEART RATE: 69 BPM | RESPIRATION RATE: 18 BRPM | SYSTOLIC BLOOD PRESSURE: 128 MMHG | OXYGEN SATURATION: 99 % | DIASTOLIC BLOOD PRESSURE: 84 MMHG | HEIGHT: 74 IN

## 2019-11-19 DIAGNOSIS — R68.89 ABNORMAL FINDING ON SCREENING PROCEDURE: ICD-10-CM

## 2019-11-19 DIAGNOSIS — F32.A MILD DEPRESSION: ICD-10-CM

## 2019-11-19 DIAGNOSIS — C61 PROSTATE CANCER (HCC): ICD-10-CM

## 2019-11-19 DIAGNOSIS — E29.1 HYPOGONADISM MALE: ICD-10-CM

## 2019-11-19 DIAGNOSIS — M54.50 ACUTE LEFT-SIDED LOW BACK PAIN WITHOUT SCIATICA: Primary | ICD-10-CM

## 2019-11-19 DIAGNOSIS — M85.872 OTHER SPECIFIED DISORDERS OF BONE DENSITY AND STRUCTURE, LEFT ANKLE AND FOOT: ICD-10-CM

## 2019-11-19 DIAGNOSIS — I70.0 AORTIC CALCIFICATION (HCC): ICD-10-CM

## 2019-11-19 NOTE — PROGRESS NOTES
1. Have you been to the ER, urgent care clinic since your last visit? Hospitalized since your last visit? No    2. Have you seen or consulted any other health care providers outside of the [address] [place] [address] since your last visit? Include any pap smears or colon screening. Yes.   Dr Mir Britton, [facility]- removed benign polyp

## 2019-11-19 NOTE — PROGRESS NOTES
Chief Complaint   Patient presents with    Back Pain     comes and goes     Left CVA pain on and off 7 days may be from heavy work out at gym  Has reduced activities this week and less pain  No rash  Pain not into buttock or leg    Also abnormal screen for osteoporosis with low BMD in heel on [facility] screen      Vitals:    11/19/19 0906   BP: 128/84   Pulse: 69   Resp: 18   Temp: 96.2 °F (35.7 °C)   TempSrc: Oral   SpO2: 99%   Weight: 175 lb (79.4 kg)   Height: 6' 2\" (1.88 m)     S1 and S2 normal, no murmurs, clicks, gallops or rubs. Regular rate and rhythm. Chest is clear; no wheezes or rales. No edema or JVD. Left cva tender with motion  Not lowe back  Legs normal  Gait normal    slrt normal      1. Hypogonadism male  review  - DEXA BONE DENSITY STUDY AXIAL; Future    2. Abnormal finding on screening procedure  Foot dexa  - DEXA BONE DENSITY STUDY AXIAL; Future    3. Other specified disorders of bone density and structure, left ankle and foot   Foot bone strength  - DEXA BONE DENSITY STUDY AXIAL; Future    4.  Acute left-sided low back pain without sciatica  Reassured this is muscular agree no lifting use aleve prn

## 2019-11-25 ENCOUNTER — HOSPITAL ENCOUNTER (OUTPATIENT)
Dept: MAMMOGRAPHY | Age: 74
Discharge: HOME | End: 2019-11-25
Attending: INTERNAL MEDICINE
Payer: MEDICARE

## 2019-11-25 DIAGNOSIS — M85.872 OTHER SPECIFIED DISORDERS OF BONE DENSITY AND STRUCTURE, LEFT ANKLE AND FOOT: ICD-10-CM

## 2019-11-25 DIAGNOSIS — E29.1 HYPOGONADISM MALE: ICD-10-CM

## 2019-11-25 DIAGNOSIS — R68.89 ABNORMAL FINDING ON SCREENING PROCEDURE: ICD-10-CM

## 2019-11-25 PROCEDURE — 77080 DXA BONE DENSITY AXIAL: CPT

## 2019-12-05 ENCOUNTER — OFFICE VISIT (OUTPATIENT)
Dept: INTERNAL MEDICINE CLINIC | Age: 74
End: 2019-12-05

## 2019-12-05 VITALS
TEMPERATURE: 96.9 F | OXYGEN SATURATION: 98 % | WEIGHT: 176 LBS | DIASTOLIC BLOOD PRESSURE: 75 MMHG | HEIGHT: 74 IN | RESPIRATION RATE: 18 BRPM | HEART RATE: 60 BPM | SYSTOLIC BLOOD PRESSURE: 136 MMHG | BODY MASS INDEX: 22.59 KG/M2

## 2019-12-05 DIAGNOSIS — C61 PROSTATE CANCER (HCC): ICD-10-CM

## 2019-12-05 DIAGNOSIS — Z00.00 MEDICARE ANNUAL WELLNESS VISIT, SUBSEQUENT: ICD-10-CM

## 2019-12-05 DIAGNOSIS — I70.0 AORTIC CALCIFICATION (HCC): ICD-10-CM

## 2019-12-05 DIAGNOSIS — R03.0 PREHYPERTENSION: Primary | ICD-10-CM

## 2019-12-05 DIAGNOSIS — F32.A MILD DEPRESSION: ICD-10-CM

## 2019-12-05 DIAGNOSIS — Z13.39 SCREENING FOR ALCOHOLISM: ICD-10-CM

## 2019-12-05 PROBLEM — D12.2 ADENOMATOUS POLYP OF ASCENDING COLON: Status: ACTIVE | Noted: 2019-12-05

## 2019-12-05 NOTE — PROGRESS NOTES
This is the Subsequent Medicare Annual Wellness Exam, performed 12 months or more after the Initial AWV or the last Subsequent AWV    I have reviewed the patient's medical history in detail and updated the computerized patient record. History     Patient Active Problem List   Diagnosis Code    Anxiety F41.9    Pulmonary nodule R91.1    Incidental lung nodule, > 3mm and < 8mm R91.1    Anxiety associated with depression F41.8    Prostate cancer ([address] C61    Schwannoma of spinal cord ([address] D33.4    Dyslipidemia, goal LDL below 100 E78.5    Prehypertension R03.0    Aortic calcification (HCC) I70.0    Mild depression ([address] F32.0     Past Medical History:   Diagnosis Date    Anxiety associated with depression     Depression     GERD (gastroesophageal reflux disease)     Glaucoma     w/uveitis & detached retina    Incidental lung nodule, > 3mm and < 8mm 6/14/2011    Optic neuritis, left     Prostate cancer [facility])     prostatectomy 9/2011    Schwannoma of spinal cord ([address] 11/1/2012      Past Surgical History:   Procedure Laterality Date    ABDOMEN SURGERY PROC UNLISTED  2003    SPLENECTOMY    ENDOSCOPY, COLON, DIAGNOSTIC  2009    HX CATARACT REMOVAL      BILATERAL    HX GI  2003    splenectomy    HX HEENT  2009    DETACHED RETINA 2X LEFT EYE    HX HEENT  2009    CORNEA TRANSPLANT LEFT EYE    HX HERNIA REPAIR  1970, 1994    BILATERAL    HX HERNIA REPAIR  9-    Diag. Lap. and repair of incisional hernia with mesh    HX PROSTATECTOMY      HX UROLOGICAL  2011    robotic prostatectomy    MT LAP,PROSTATECTOMY,RADICAL,W/NERVE SPARE,INCL ROBOTIC  9/2011    REPAIR INCISIONAL HERNIA,REDUCIBLE  09/25/11    diagnostic lap. wih mesh     Current Outpatient Medications   Medication Sig Dispense Refill    sertraline (ZOLOFT) 50 mg tablet TAKE ONE TABLET BY MOUTH DAILY (Patient taking differently: 25 mg.  TAKE ONE TABLET BY MOUTH DAILY) 90 Tab 0    zolpidem (AMBIEN) 5 mg tablet TAKE ONE TABLET BY MOUTH AT BEDTIME AS NEEDED FOR SLEEP 30 Tab 0    cyanocobalamin, vitamin B-12, 2,500 mcg lozg by SubLINGual route.  atorvastatin (LIPITOR) 10 mg tablet TAKE ONE TABLET BY MOUTH DAILY (Patient taking differently: 5 mg.) 90 Tab 0    co-enzyme Q-10 (CO Q-10) 100 mg capsule Take 200 mg by mouth daily.  aspirin 81 mg chewable tablet Take 1 Tab by mouth daily. 100 Tab 11    DORZOLAMIDE HCL/TIMOLOL MALEAT (COSOPT OP) Apply  to eye.  Vit C-Vit E-Copper-ZnOx-Lutein (PRESERVISION) 226-200-5 mg-unit-mg Cap Take  by mouth daily. Allergies   Allergen Reactions    Iodinated Contrast Media Nausea and Vomiting    Iodine Nausea and Vomiting    Morphine Hives    Pcn [Penicillins] Hives    Tamiflu [Oseltamivir] Rash       Family History   Problem Relation Age of Onset    Liver Disease Mother     Heart Disease Father 68    Elevated Lipids Father     Cancer Sister      Social History     Tobacco Use    Smoking status: Former Smoker     Years: 8.00     Last attempt to quit: 1975     Years since quittin.2    Smokeless tobacco: Never Used   Substance Use Topics    Alcohol use: Yes     Comment: RARE       Depression Risk Factor Screening:     3 most recent PHQ Screens 2019   PHQ Not Done -   Little interest or pleasure in doing things Not at all   Feeling down, depressed, irritable, or hopeless Not at all   Total Score PHQ 2 0       Alcohol Risk Factor Screening (MALE > 65): Do you average more 1 drink per night or more than 7 drinks a week: No    In the past three months have you have had more than 4 drinks containing alcohol on one occasion: No      Functional Ability and Level of Safety:   Hearing: Hearing is good. Activities of Daily Living: The home contains: no safety equipment. Patient does total self care    Ambulation: with no difficulty    Fall Risk:  Fall Risk Assessment, last 12 mths 2019   Able to walk? Yes   Fall in past 12 months?  No       Abuse Screen:  Patient is not abused    Cognitive Screening   Has your family/caregiver stated any concerns about your memory: no  Cognitive Screening: Normal - Verbal Fluency Test    Patient Care Team   Patient Care Team:  Luma Blair MD as PCP - General  Luma Blair MD as PCP - [facility] Empaneled Provider    Assessment/Plan   Education and counseling provided:  Are appropriate based on today's review and evaluation  End-of-Life planning (with patient's consent)    Diagnoses and all orders for this visit:    1. Medicare annual wellness visit, subsequent    2. Screening for alcoholism  -     [place] ANNUAL ALCOHOL SCREEN 15 MIN        Health Maintenance Due   Topic Date Due    Shingrix Vaccine Age 49> (1 of 2) 10/06/1997    Pneumococcal 65+ years (1 of 1 - PPSV23) 11/01/2017    COLONOSCOPY  07/01/2019    MEDICARE YEARLY EXAM  12/05/2019     SUBJECTIVE: Ever Ridley Sr is a 69 y.o. male seen for a follow up visit; he has hyperlipidemia and borderline HTN. Current Outpatient Medications   Medication Sig Dispense Refill    sertraline (ZOLOFT) 50 mg tablet TAKE ONE TABLET BY MOUTH DAILY (Patient taking differently: 25 mg. TAKE ONE TABLET BY MOUTH DAILY) 90 Tab 0    zolpidem (AMBIEN) 5 mg tablet TAKE ONE TABLET BY MOUTH AT BEDTIME AS NEEDED FOR SLEEP 30 Tab 0    cyanocobalamin, vitamin B-12, 2,500 mcg lozg by SubLINGual route.  atorvastatin (LIPITOR) 10 mg tablet TAKE ONE TABLET BY MOUTH DAILY (Patient taking differently: 5 mg.) 90 Tab 0    co-enzyme Q-10 (CO Q-10) 100 mg capsule Take 200 mg by mouth daily.  aspirin 81 mg chewable tablet Take 1 Tab by mouth daily. 100 Tab 11    DORZOLAMIDE HCL/TIMOLOL MALEAT (COSOPT OP) Apply  to eye.  Vit C-Vit E-Copper-ZnOx-Lutein (PRESERVISION) 226-200-5 mg-unit-mg Cap Take  by mouth daily.        Patient Active Problem List   Diagnosis Code    Anxiety F41.9    Pulmonary nodule R91.1    Incidental lung nodule, > 3mm and < 8mm R91.1    Anxiety associated with depression F41.8    Prostate cancer ([facility] C61    Schwannoma of spinal cord ([facility] D33.4    Dyslipidemia, goal LDL below 100 E78.5    Prehypertension R03.0    Aortic calcification (HCC) I70.0    Mild depression (HCC) F32.0    Adenomatous polyp of ascending colon D12.2     System Review: Cardiovascular ROS - taking medications as instructed, no medication side effects noted, home BP monitoring in range of 565'B systolic over 34'N diastolic, no TIA's, no chest pain on exertion, no dyspnea on exertion, no swelling of ankles. New concerns: none has cut back on statin and sertraline at his own idea  Not on any antihypertensive. Chronic anxiety well controlled low dose SSTI  OBJECTIVE:  Visit Vitals  /75   Pulse 60   Temp 96.9 °F (36.1 °C) (Oral)   Resp 18   Ht 6' 2\" (1.88 m)   Wt 176 lb (79.8 kg)   SpO2 98%   BMI 22.60 kg/m²      Appearance: alert, well appearing, and in no distress, oriented to person, place, and time, normal appearing weight and well hydrated. General exam: CVS exam BP noted to be well controlled today in office, S1, S2 normal, no gallop, no murmur, chest clear, no JVD, no HSM, no edema. Lab review: orders written for new lab studies as appropriate; see orders, no lab studies available for review at time of visit. ASSESSMENT:  hypertension reasonably well controlled, borderline controlled, hyperlipidemia asymptomatic, reasonably well controlled, advise LDL at least < 100  Maybe better at 70. PLAN:  current treatment plan is effective, no change in therapy  lab results and schedule of future lab studies reviewed with patient  repeat labs ordered prior to next appointment  orders and follow up as documented in patient record  reviewed diet, exercise and weight control  recommended sodium restriction. Diagnoses and all orders for this visit:    1. Prehypertension  -     CBC WITH AUTOMATED DIFF; Future    2. Medicare annual wellness visit, subsequent    3.  Screening for alcoholism  -     [place] ANNUAL ALCOHOL SCREEN 15 MIN    4. Aortic calcification (HCC)  -     CBC WITH AUTOMATED DIFF; Future  -     LIPID PANEL; Future  -     METABOLIC PANEL, COMPREHENSIVE; Future    5. Prostate cancer ([address]  -     PSA, DIAGNOSTIC (PROSTATE SPECIFIC AG); Future  -     URINALYSIS W/ RFLX MICROSCOPIC; Future        This is the Subsequent Medicare Annual Wellness Exam, performed 12 months or more after the Initial AWV or the last Subsequent AWV    I have reviewed the patient's medical history in detail and updated the computerized patient record. History     Patient Active Problem List   Diagnosis Code    Anxiety F41.9    Pulmonary nodule R91.1    Incidental lung nodule, > 3mm and < 8mm R91.1    Anxiety associated with depression F41.8    Prostate cancer ([facility] C61    Schwannoma of spinal cord ([address] D33.4    Dyslipidemia, goal LDL below 100 E78.5    Prehypertension R03.0    Aortic calcification (HCC) I70.0    Mild depression (HCC) F32.0    Adenomatous polyp of ascending colon D12.2     Past Medical History:   Diagnosis Date    Anxiety associated with depression     Depression     GERD (gastroesophageal reflux disease)     Glaucoma     w/uveitis & detached retina    Incidental lung nodule, > 3mm and < 8mm 6/14/2011    Optic neuritis, left     Prostate cancer [facility])     prostatectomy 9/2011    Schwannoma of spinal cord ([address] 11/1/2012      Past Surgical History:   Procedure Laterality Date    ABDOMEN SURGERY PROC UNLISTED  2003    SPLENECTOMY    ENDOSCOPY, COLON, DIAGNOSTIC  2009    HX CATARACT REMOVAL      BILATERAL    HX GI  2003    splenectomy    HX HEENT  2009    DETACHED RETINA 2X LEFT EYE    HX HEENT  2009    CORNEA TRANSPLANT LEFT EYE    HX HERNIA REPAIR  1970, 1994    BILATERAL    HX HERNIA REPAIR  9-    Diag.  Lap. and repair of incisional hernia with mesh    HX PROSTATECTOMY      HX UROLOGICAL  2011    robotic prostatectomy    VT LAP,PROSTATECTOMY,RADICAL,W/NERVE SPARE,INCL ROBOTIC  2011    REPAIR INCISIONAL HERNIA,REDUCIBLE  11    diagnostic lap. wih mesh     Current Outpatient Medications   Medication Sig Dispense Refill    sertraline (ZOLOFT) 50 mg tablet TAKE ONE TABLET BY MOUTH DAILY (Patient taking differently: 25 mg. TAKE ONE TABLET BY MOUTH DAILY) 90 Tab 0    zolpidem (AMBIEN) 5 mg tablet TAKE ONE TABLET BY MOUTH AT BEDTIME AS NEEDED FOR SLEEP 30 Tab 0    cyanocobalamin, vitamin B-12, 2,500 mcg lozg by SubLINGual route.  atorvastatin (LIPITOR) 10 mg tablet TAKE ONE TABLET BY MOUTH DAILY (Patient taking differently: 5 mg.) 90 Tab 0    co-enzyme Q-10 (CO Q-10) 100 mg capsule Take 200 mg by mouth daily.  aspirin 81 mg chewable tablet Take 1 Tab by mouth daily. 100 Tab 11    DORZOLAMIDE HCL/TIMOLOL MALEAT (COSOPT OP) Apply  to eye.  Vit C-Vit E-Copper-ZnOx-Lutein (PRESERVISION) 226-200-5 mg-unit-mg Cap Take  by mouth daily. Allergies   Allergen Reactions    Iodinated Contrast Media Nausea and Vomiting    Iodine Nausea and Vomiting    Morphine Hives    Pcn [Penicillins] Hives    Tamiflu [Oseltamivir] Rash       Family History   Problem Relation Age of Onset    Liver Disease Mother     Heart Disease Father 68    Elevated Lipids Father     Cancer Sister      Social History     Tobacco Use    Smoking status: Former Smoker     Years: 8.00     Last attempt to quit: 1975     Years since quittin.2    Smokeless tobacco: Never Used   Substance Use Topics    Alcohol use: Yes     Comment: RARE       Depression Risk Factor Screening:     3 most recent PHQ Screens 2019   PHQ Not Done -   Little interest or pleasure in doing things Not at all   Feeling down, depressed, irritable, or hopeless Not at all   Total Score PHQ 2 0       Alcohol Risk Factor Screening (MALE > 65):    Do you average more 1 drink per night or more than 7 drinks a week: No    In the past three months have you have had more than 4 drinks containing alcohol on one occasion: No      Functional Ability and Level of Safety:   Hearing: Hearing is good. Activities of Daily Living: The home contains: no safety equipment. Patient does total self care    Ambulation: with no difficulty    Fall Risk:  Fall Risk Assessment, last 12 mths 11/19/2019   Able to walk? Yes   Fall in past 12 months? No       Abuse Screen:  Patient is not abused    Cognitive Screening   Has your family/caregiver stated any concerns about your memory: no  Cognitive Screening: Normal - Verbal Fluency Test    Patient Care Team   Patient Care Team:  Mercy Alvares MD as PCP - General  Mercy Alvares MD as PCP - [facility] Provider    Assessment/Plan   Education and counseling provided:  Are appropriate based on today's review and evaluation    Diagnoses and all orders for this visit:    1. Prehypertension  -     CBC WITH AUTOMATED DIFF; Future    2. Medicare annual wellness visit, subsequent    3. Screening for alcoholism  -     [place] ANNUAL ALCOHOL SCREEN 15 MIN    4. Aortic calcification (HCC)  -     CBC WITH AUTOMATED DIFF; Future  -     LIPID PANEL; Future  -     METABOLIC PANEL, COMPREHENSIVE; Future    5. Prostate cancer ([facility]  -     PSA, DIAGNOSTIC (PROSTATE SPECIFIC AG); Future  -     URINALYSIS W/ RFLX MICROSCOPIC; Future        Health Maintenance Due   Topic Date Due    Shingrix Vaccine Age 49> (1 of 2) 10/06/1997    Pneumococcal 65+ years (1 of 1 - PPSV23) 11/01/2017    COLONOSCOPY  07/01/2019    MEDICARE YEARLY EXAM  12/05/2019     1. Medicare annual wellness visit, subsequent  Reviewed diet    2. Screening for alcoholism  minimal  - [place] ANNUAL ALCOHOL SCREEN 15 MIN    3. Prehypertension  Borderline keeps watch  - CBC WITH AUTOMATED DIFF; Future    4. Aortic calcification (HCC)  Known so statin indicated  - CBC WITH AUTOMATED DIFF; Future  - LIPID PANEL; Future  - METABOLIC PANEL, COMPREHENSIVE; Future    5.  Prostate cancer ([address]  Stable   With no incontinence  - PSA, DIAGNOSTIC (PROSTATE SPECIFIC AG); Future  - URINALYSIS W/ RFLX MICROSCOPIC; Future    6.  Mild depression ([address]  On ssri

## 2019-12-05 NOTE — PROGRESS NOTES
1. Have you been to the ER, urgent care clinic since your last visit? Hospitalized since your last visit? No    2. Have you seen or consulted any other health care providers outside of the [address] [place] [address] since your last visit? Include any pap smears or colon screening.  No

## 2019-12-05 NOTE — PATIENT INSTRUCTIONS
Medicare Wellness Visit, Male    The best way to live healthy is to have a lifestyle where you eat a well-balanced diet, exercise regularly, limit alcohol use, and quit all forms of tobacco/nicotine, if applicable. Regular preventive services are another way to keep healthy. Preventive services (vaccines, screening tests, monitoring & exams) can help personalize your care plan, which helps you manage your own care. Screening tests can find health problems at the earliest stages, when they are easiest to treat. [facility] follows the current, evidence-based guidelines published by the [facility] ([facility]) when recommending preventive services for our patients. Because we follow these guidelines, sometimes recommendations change over time as research supports it. (For example, a prostate screening blood test is no longer routinely recommended for men with no symptoms). Of course, you and your doctor may decide to screen more often for some diseases, based on your risk and co-morbidities (chronic disease you are already diagnosed with). Preventive services for you include:  - Medicare offers their members a free annual wellness visit, which is time for you and your primary care provider to discuss and plan for your preventive service needs. Take advantage of this benefit every year!  -All adults over age 72 should receive the recommended pneumonia vaccines. Current USPSTF guidelines recommend a series of two vaccines for the best pneumonia protection.   -All adults should have a flu vaccine yearly and tetanus vaccine every 10 years.  -All adults age 48 and older should receive the shingles vaccines (series of two vaccines).        -All adults age 38-68 who are overweight should have a diabetes screening test once every three years.   -Other screening tests & preventive services for persons with diabetes include: an eye exam to screen for diabetic retinopathy, a kidney function test, a foot exam, and stricter control over your cholesterol.   -Cardiovascular screening for adults with routine risk involves an electrocardiogram (ECG) at intervals determined by the provider.   -Colorectal cancer screening should be done for adults age 54-65 with no increased risk factors for colorectal cancer. There are a number of acceptable methods of screening for this type of cancer. Each test has its own benefits and drawbacks. Discuss with your provider what is most appropriate for you during your annual wellness visit. The different tests include: colonoscopy (considered the best screening method), a fecal occult blood test, a fecal DNA test, and sigmoidoscopy.  -All adults born between [facility] should be screened once for Hepatitis C.  -An Abdominal Aortic Aneurysm (AAA) Screening is recommended for men age 73-68 who has ever smoked in their lifetime. Here is a list of your current Health Maintenance items (your personalized list of preventive services) with a due date:  Health Maintenance Due   Topic Date Due    Shingles Vaccine (1 of 2) 10/06/1997    Pneumococcal Vaccine (1 of 1 - PPSV23) 11/01/2017    Colonoscopy  07/01/2019    Annual Well Visit  12/05/2019     Medicare Wellness Visit, Male    The best way to live healthy is to have a lifestyle where you eat a well-balanced diet, exercise regularly, limit alcohol use, and quit all forms of tobacco/nicotine, if applicable. Regular preventive services are another way to keep healthy. Preventive services (vaccines, screening tests, monitoring & exams) can help personalize your care plan, which helps you manage your own care. Screening tests can find health problems at the earliest stages, when they are easiest to treat.    [facility] follows the current, evidence-based guidelines published by the [place] [employer] (USPSTF) when recommending preventive services for our patients. Because we follow these guidelines, sometimes recommendations change over time as research supports it. (For example, a prostate screening blood test is no longer routinely recommended for men with no symptoms). Of course, you and your doctor may decide to screen more often for some diseases, based on your risk and co-morbidities (chronic disease you are already diagnosed with). Preventive services for you include:  - Medicare offers their members a free annual wellness visit, which is time for you and your primary care provider to discuss and plan for your preventive service needs. Take advantage of this benefit every year!  -All adults over age 72 should receive the recommended pneumonia vaccines. Current USPSTF guidelines recommend a series of two vaccines for the best pneumonia protection.   -All adults should have a flu vaccine yearly and tetanus vaccine every 10 years.  -All adults age 48 and older should receive the shingles vaccines (series of two vaccines). -All adults age 38-68 who are overweight should have a diabetes screening test once every three years.   -Other screening tests & preventive services for persons with diabetes include: an eye exam to screen for diabetic retinopathy, a kidney function test, a foot exam, and stricter control over your cholesterol.   -Cardiovascular screening for adults with routine risk involves an electrocardiogram (ECG) at intervals determined by the provider.   -Colorectal cancer screening should be done for adults age 54-65 with no increased risk factors for colorectal cancer. There are a number of acceptable methods of screening for this type of cancer. Each test has its own benefits and drawbacks. Discuss with your provider what is most appropriate for you during your annual wellness visit.  The different tests include: colonoscopy (considered the best screening method), a fecal occult blood test, a fecal DNA test, and sigmoidoscopy.  -All adults born between 1945 and 1965 should be screened once for Hepatitis C.  -An Abdominal Aortic Aneurysm (AAA) Screening is recommended for men age 73-68 who has ever smoked in their lifetime.      Here is a list of your current Health Maintenance items (your personalized list of preventive services) with a due date:  Health Maintenance Due   Topic Date Due    Shingles Vaccine (1 of 2) 10/06/1997    Pneumococcal Vaccine (1 of 1 - PPSV23) 11/01/2017    Colonoscopy  07/01/2019    Annual Well Visit  12/05/2019

## 2019-12-06 ENCOUNTER — HOSPITAL ENCOUNTER (OUTPATIENT)
Dept: LAB | Age: 74
Discharge: HOME | End: 2019-12-06

## 2019-12-06 DIAGNOSIS — R03.0 PREHYPERTENSION: ICD-10-CM

## 2019-12-06 DIAGNOSIS — I70.0 AORTIC CALCIFICATION (HCC): ICD-10-CM

## 2019-12-06 DIAGNOSIS — C61 PROSTATE CANCER (HCC): ICD-10-CM

## 2019-12-06 LAB
ALBUMIN SERPL-MCNC: 3.8 G/DL (ref 3.5–5)
ALBUMIN/GLOB SERPL: 1.2 {RATIO} (ref 1.1–2.2)
ALP SERPL-CCNC: 107 U/L (ref 45–117)
ALT SERPL-CCNC: 22 U/L (ref 12–78)
ANION GAP SERPL CALC-SCNC: 4 MMOL/L (ref 5–15)
APPEARANCE UR: CLEAR
AST SERPL-CCNC: 17 U/L (ref 15–37)
BACTERIA URNS QL MICRO: NEGATIVE /HPF
BASOPHILS # BLD: 0.1 K/UL (ref 0–0.1)
BASOPHILS NFR BLD: 2 % (ref 0–1)
BILIRUB SERPL-MCNC: 0.7 MG/DL (ref 0.2–1)
BILIRUB UR QL: NEGATIVE
BUN SERPL-MCNC: 18 MG/DL (ref 6–20)
BUN/CREAT SERPL: 19 (ref 12–20)
CALCIUM SERPL-MCNC: 8.9 MG/DL (ref 8.5–10.1)
CHLORIDE SERPL-SCNC: 103 MMOL/L (ref 97–108)
CHOLEST SERPL-MCNC: 164 MG/DL
CO2 SERPL-SCNC: 31 MMOL/L (ref 21–32)
COLOR UR: ABNORMAL
CREAT SERPL-MCNC: 0.97 MG/DL (ref 0.7–1.3)
DIFFERENTIAL METHOD BLD: ABNORMAL
EOSINOPHIL # BLD: 0.3 K/UL (ref 0–0.4)
EOSINOPHIL NFR BLD: 4 % (ref 0–7)
EPITH CASTS URNS QL MICRO: ABNORMAL /LPF
ERYTHROCYTE [DISTWIDTH] IN BLOOD BY AUTOMATED COUNT: 12.7 % (ref 11.5–14.5)
GLOBULIN SER CALC-MCNC: 3.1 G/DL (ref 2–4)
GLUCOSE SERPL-MCNC: 102 MG/DL (ref 65–100)
GLUCOSE UR STRIP.AUTO-MCNC: NEGATIVE MG/DL
HCT VFR BLD AUTO: 46 % (ref 36.6–50.3)
HDLC SERPL-MCNC: 71 MG/DL
HDLC SERPL: 2.3 {RATIO} (ref 0–5)
HGB BLD-MCNC: 15.2 G/DL (ref 12.1–17)
HGB UR QL STRIP: ABNORMAL
HYALINE CASTS URNS QL MICRO: ABNORMAL /LPF (ref 0–5)
IMM GRANULOCYTES # BLD AUTO: 0 K/UL (ref 0–0.04)
IMM GRANULOCYTES NFR BLD AUTO: 0 % (ref 0–0.5)
KETONES UR QL STRIP.AUTO: NEGATIVE MG/DL
LDLC SERPL CALC-MCNC: 78.4 MG/DL (ref 0–100)
LEUKOCYTE ESTERASE UR QL STRIP.AUTO: NEGATIVE
LIPID PROFILE,FLP: NORMAL
LYMPHOCYTES # BLD: 2.4 K/UL (ref 0.8–3.5)
LYMPHOCYTES NFR BLD: 33 % (ref 12–49)
MCH RBC QN AUTO: 33.4 PG (ref 26–34)
MCHC RBC AUTO-ENTMCNC: 33 G/DL (ref 30–36.5)
MCV RBC AUTO: 101.1 FL (ref 80–99)
MONOCYTES # BLD: 0.7 K/UL (ref 0–1)
MONOCYTES NFR BLD: 10 % (ref 5–13)
NEUTS SEG # BLD: 3.7 K/UL (ref 1.8–8)
NEUTS SEG NFR BLD: 51 % (ref 32–75)
NITRITE UR QL STRIP.AUTO: NEGATIVE
NRBC # BLD: 0 K/UL (ref 0–0.01)
NRBC BLD-RTO: 0 PER 100 WBC
PH UR STRIP: 6 [PH] (ref 5–8)
PLATELET # BLD AUTO: 275 K/UL (ref 150–400)
PMV BLD AUTO: 10.3 FL (ref 8.9–12.9)
POTASSIUM SERPL-SCNC: 4.2 MMOL/L (ref 3.5–5.1)
PROT SERPL-MCNC: 6.9 G/DL (ref 6.4–8.2)
PROT UR STRIP-MCNC: NEGATIVE MG/DL
PSA SERPL-MCNC: 0 NG/ML (ref 0.01–4)
RBC # BLD AUTO: 4.55 M/UL (ref 4.1–5.7)
RBC #/AREA URNS HPF: ABNORMAL /HPF (ref 0–5)
SODIUM SERPL-SCNC: 138 MMOL/L (ref 136–145)
SP GR UR REFRACTOMETRY: 1.02 (ref 1–1.03)
TRIGL SERPL-MCNC: 73 MG/DL (ref ?–150)
UROBILINOGEN UR QL STRIP.AUTO: 0.2 EU/DL (ref 0.2–1)
VLDLC SERPL CALC-MCNC: 14.6 MG/DL
WBC # BLD AUTO: 7.2 K/UL (ref 4.1–11.1)
WBC URNS QL MICRO: ABNORMAL /HPF (ref 0–4)

## 2019-12-17 ENCOUNTER — HOSPITAL ENCOUNTER (OUTPATIENT)
Dept: CT IMAGING | Age: 74
Discharge: HOME | End: 2019-12-17
Attending: UROLOGY
Payer: MEDICARE

## 2019-12-17 DIAGNOSIS — R31.9 HEMATURIA SYNDROME: ICD-10-CM

## 2019-12-17 PROCEDURE — 74178 CT ABD&PLV WO CNTR FLWD CNTR: CPT

## 2019-12-17 PROCEDURE — 74011636320 HC RX REV CODE- 636/320: Performed by: UROLOGY

## 2019-12-17 RX ADMIN — IOPAMIDOL 100 ML: 755 INJECTION, SOLUTION INTRAVENOUS at 16:44

## 2020-05-28 ENCOUNTER — VIRTUAL VISIT (OUTPATIENT)
Dept: INTERNAL MEDICINE CLINIC | Age: 75
End: 2020-05-28

## 2020-05-28 VITALS — SYSTOLIC BLOOD PRESSURE: 119 MMHG | BODY MASS INDEX: 21.57 KG/M2 | DIASTOLIC BLOOD PRESSURE: 76 MMHG | WEIGHT: 168 LBS

## 2020-05-28 DIAGNOSIS — F32.A MILD DEPRESSION: ICD-10-CM

## 2020-05-28 DIAGNOSIS — C61 PROSTATE CANCER (HCC): ICD-10-CM

## 2020-05-28 DIAGNOSIS — I70.0 AORTIC CALCIFICATION (HCC): ICD-10-CM

## 2020-05-28 DIAGNOSIS — G47.00 INSOMNIA, UNSPECIFIED TYPE: ICD-10-CM

## 2020-05-28 RX ORDER — ZOLPIDEM TARTRATE 5 MG/1
TABLET ORAL
Qty: 30 TAB | Refills: 0 | Status: SHIPPED | OUTPATIENT
Start: 2020-05-28 | End: 2021-01-22

## 2020-05-28 NOTE — PROGRESS NOTES
Chief Complaint   Patient presents with    Medication Evaluation     Subjective:  Generally healthy 44-year-old, who is requesting a refill on Ambien. He takes Sertraline and serotonin inhibitor for about a year, he is on 50 mg a day. He used to have terrible anxiety and his anxiety is pretty much nonexistent now for him and his wife. He is feeling okay otherwise. He uses the Ambien 5 mg only occasionally. He has used about 20 in the last four to five months. Wondered if he could have a refill. He uses it when he travels, he uses it when he drives back and forth to [place] so he gets a good night's sleep. He has not had any side effects from it, does well. He sleeps okay most nights. He says he has been fine, he has had no big issues. His vitals have been stable. Plan:  I went ahead and refilled it. I told him he should stay on the Sertraline because it has helped his anxiety. He is on 50 mg, which is not a high dose. If he wanted to try 25 he could, but for now 50 mg seems to be appropriate, and the refill of Ambien is appropriate, and he knows the restrictions and understands that. Vitals:    05/28/20 1437   BP: 119/76   Weight: 168 lb (76.2 kg)     Wt Readings from Last 3 Encounters:   05/28/20 168 lb (76.2 kg)   12/05/19 176 lb (79.8 kg)   11/19/19 175 lb (79.4 kg)     1. Insomnia, unspecified type  Refill ok  - zolpidem (AMBIEN) 5 mg tablet; TAKE ONE TABLET BY MOUTH AT BEDTIME AS NEEDED FOR SLEEP  Dispense: 30 Tab; Refill: 0    2. Mild depression (HCC)  Stable on ssri    3. Aortic calcification (HCC)  following    4.  Prostate cancer ([facility]  remission  Requested Prescriptions     Signed Prescriptions Disp Refills    zolpidem (AMBIEN) 5 mg tablet 30 Tab 0     Sig: TAKE ONE TABLET BY MOUTH AT BEDTIME AS NEEDED FOR SLEEP

## 2020-05-28 NOTE — PROGRESS NOTES
1. Have you been to the ER, urgent care clinic since your last visit? Hospitalized since your last visit? No    2. Have you seen or consulted any other health care providers outside of the [address] [place] [address] since your last visit? Include any pap smears or colon screening.  No   Chief Complaint   Patient presents with    Medication Evaluation

## 2020-11-24 ENCOUNTER — PATIENT MESSAGE (OUTPATIENT)
Dept: INTERNAL MEDICINE CLINIC | Age: 75
End: 2020-11-24

## 2020-12-07 ENCOUNTER — OFFICE VISIT (OUTPATIENT)
Dept: INTERNAL MEDICINE CLINIC | Age: 75
End: 2020-12-07
Payer: MEDICARE

## 2020-12-07 VITALS
TEMPERATURE: 95.3 F | SYSTOLIC BLOOD PRESSURE: 118 MMHG | HEART RATE: 66 BPM | OXYGEN SATURATION: 95 % | DIASTOLIC BLOOD PRESSURE: 61 MMHG | HEIGHT: 74 IN | WEIGHT: 174.6 LBS | BODY MASS INDEX: 22.41 KG/M2 | RESPIRATION RATE: 18 BRPM

## 2020-12-07 DIAGNOSIS — C61 PROSTATE CANCER (HCC): ICD-10-CM

## 2020-12-07 DIAGNOSIS — I70.0 AORTIC CALCIFICATION (HCC): Primary | ICD-10-CM

## 2020-12-07 DIAGNOSIS — K43.9 VENTRAL HERNIA WITHOUT OBSTRUCTION OR GANGRENE: ICD-10-CM

## 2020-12-07 DIAGNOSIS — R53.82 CHRONIC FATIGUE: ICD-10-CM

## 2020-12-07 DIAGNOSIS — Z00.00 MEDICARE ANNUAL WELLNESS VISIT, SUBSEQUENT: ICD-10-CM

## 2020-12-07 DIAGNOSIS — E78.5 DYSLIPIDEMIA, GOAL LDL BELOW 100: ICD-10-CM

## 2020-12-07 DIAGNOSIS — R03.0 PREHYPERTENSION: ICD-10-CM

## 2020-12-07 PROCEDURE — G8427 DOCREV CUR MEDS BY ELIG CLIN: HCPCS | Performed by: INTERNAL MEDICINE

## 2020-12-07 PROCEDURE — 3017F COLORECTAL CA SCREEN DOC REV: CPT | Performed by: INTERNAL MEDICINE

## 2020-12-07 PROCEDURE — G0463 HOSPITAL OUTPT CLINIC VISIT: HCPCS | Performed by: INTERNAL MEDICINE

## 2020-12-07 PROCEDURE — 1101F PT FALLS ASSESS-DOCD LE1/YR: CPT | Performed by: INTERNAL MEDICINE

## 2020-12-07 PROCEDURE — 99214 OFFICE O/P EST MOD 30 MIN: CPT | Performed by: INTERNAL MEDICINE

## 2020-12-07 PROCEDURE — G0439 PPPS, SUBSEQ VISIT: HCPCS | Performed by: INTERNAL MEDICINE

## 2020-12-07 PROCEDURE — G8420 CALC BMI NORM PARAMETERS: HCPCS | Performed by: INTERNAL MEDICINE

## 2020-12-07 PROCEDURE — G9717 DOC PT DX DEP/BP F/U NT REQ: HCPCS | Performed by: INTERNAL MEDICINE

## 2020-12-07 PROCEDURE — G8536 NO DOC ELDER MAL SCRN: HCPCS | Performed by: INTERNAL MEDICINE

## 2020-12-07 NOTE — PROGRESS NOTES
Chief Complaint   Patient presents with    Complete Physical    Medication Refill     90 day refills       1. Have you been to the ER, urgent care clinic since your last visit? Hospitalized since your last visit? No    2. Have you seen or consulted any other health care providers outside of the [address] [place] [address] since your last visit? Include any pap smears or colon screening.  No

## 2020-12-07 NOTE — PATIENT INSTRUCTIONS
Medicare Wellness Visit, Male    The best way to live healthy is to have a lifestyle where you eat a well-balanced diet, exercise regularly, limit alcohol use, and quit all forms of tobacco/nicotine, if applicable. Regular preventive services are another way to keep healthy. Preventive services (vaccines, screening tests, monitoring & exams) can help personalize your care plan, which helps you manage your own care. Screening tests can find health problems at the earliest stages, when they are easiest to treat. [facility] follows the current, evidence-based guidelines published by the [facility] ([facility]) when recommending preventive services for our patients. Because we follow these guidelines, sometimes recommendations change over time as research supports it. (For example, a prostate screening blood test is no longer routinely recommended for men with no symptoms). Of course, you and your doctor may decide to screen more often for some diseases, based on your risk and co-morbidities (chronic disease you are already diagnosed with). Preventive services for you include:  - Medicare offers their members a free annual wellness visit, which is time for you and your primary care provider to discuss and plan for your preventive service needs. Take advantage of this benefit every year!  -All adults over age 72 should receive the recommended pneumonia vaccines. Current USPSTF guidelines recommend a series of two vaccines for the best pneumonia protection.   -All adults should have a flu vaccine yearly and tetanus vaccine every 10 years.  -All adults age 48 and older should receive the shingles vaccines (series of two vaccines).        -All adults age 38-68 who are overweight should have a diabetes screening test once every three years.   -Other screening tests & preventive services for persons with diabetes include: an eye exam to screen for diabetic retinopathy, a kidney function test, a foot exam, and stricter control over your cholesterol.   -Cardiovascular screening for adults with routine risk involves an electrocardiogram (ECG) at intervals determined by the provider.   -Colorectal cancer screening should be done for adults age 54-65 with no increased risk factors for colorectal cancer. There are a number of acceptable methods of screening for this type of cancer. Each test has its own benefits and drawbacks. Discuss with your provider what is most appropriate for you during your annual wellness visit. The different tests include: colonoscopy (considered the best screening method), a fecal occult blood test, a fecal DNA test, and sigmoidoscopy.  -All adults born between [facility] should be screened once for Hepatitis C.  -An Abdominal Aortic Aneurysm (AAA) Screening is recommended for men age 73-68 who has ever smoked in their lifetime.      Here is a list of your current Health Maintenance items (your personalized list of preventive services) with a due date:  Health Maintenance Due   Topic Date Due    Shingles Vaccine (1 of 2) 10/06/1997    Colorectal Screening  07/01/2019    Glaucoma Screening   09/14/2020    DTaP/Tdap/Td  (2 - Td) 09/14/2020    Annual Well Visit  12/05/2020

## 2020-12-08 DIAGNOSIS — C61 PROSTATE CANCER (HCC): ICD-10-CM

## 2020-12-08 DIAGNOSIS — R53.82 CHRONIC FATIGUE: ICD-10-CM

## 2020-12-08 DIAGNOSIS — E78.5 DYSLIPIDEMIA, GOAL LDL BELOW 100: ICD-10-CM

## 2020-12-08 LAB
ALBUMIN SERPL-MCNC: 3.7 G/DL (ref 3.5–5)
ALBUMIN/GLOB SERPL: 1.2 {RATIO} (ref 1.1–2.2)
ALP SERPL-CCNC: 116 U/L (ref 45–117)
ALT SERPL-CCNC: 25 U/L (ref 12–78)
ANION GAP SERPL CALC-SCNC: 5 MMOL/L (ref 5–15)
AST SERPL-CCNC: 21 U/L (ref 15–37)
BASOPHILS # BLD: 0.1 K/UL (ref 0–0.1)
BASOPHILS NFR BLD: 2 % (ref 0–1)
BILIRUB SERPL-MCNC: 0.5 MG/DL (ref 0.2–1)
BUN SERPL-MCNC: 17 MG/DL (ref 6–20)
BUN/CREAT SERPL: 17 (ref 12–20)
CALCIUM SERPL-MCNC: 9 MG/DL (ref 8.5–10.1)
CHLORIDE SERPL-SCNC: 105 MMOL/L (ref 97–108)
CHOLEST SERPL-MCNC: 164 MG/DL
CO2 SERPL-SCNC: 31 MMOL/L (ref 21–32)
CREAT SERPL-MCNC: 1.02 MG/DL (ref 0.7–1.3)
DIFFERENTIAL METHOD BLD: ABNORMAL
EOSINOPHIL # BLD: 0.5 K/UL (ref 0–0.4)
EOSINOPHIL NFR BLD: 8 % (ref 0–7)
ERYTHROCYTE [DISTWIDTH] IN BLOOD BY AUTOMATED COUNT: 12.9 % (ref 11.5–14.5)
GLOBULIN SER CALC-MCNC: 3.1 G/DL (ref 2–4)
GLUCOSE SERPL-MCNC: 95 MG/DL (ref 65–100)
HCT VFR BLD AUTO: 44.9 % (ref 36.6–50.3)
HDLC SERPL-MCNC: 59 MG/DL
HDLC SERPL: 2.8 {RATIO} (ref 0–5)
HGB BLD-MCNC: 14.7 G/DL (ref 12.1–17)
IMM GRANULOCYTES # BLD AUTO: 0 K/UL (ref 0–0.04)
IMM GRANULOCYTES NFR BLD AUTO: 0 % (ref 0–0.5)
LDLC SERPL CALC-MCNC: 84.2 MG/DL (ref 0–100)
LIPID PROFILE,FLP: NORMAL
LYMPHOCYTES # BLD: 2.1 K/UL (ref 0.8–3.5)
LYMPHOCYTES NFR BLD: 33 % (ref 12–49)
MCH RBC QN AUTO: 33 PG (ref 26–34)
MCHC RBC AUTO-ENTMCNC: 32.7 G/DL (ref 30–36.5)
MCV RBC AUTO: 100.7 FL (ref 80–99)
MONOCYTES # BLD: 0.6 K/UL (ref 0–1)
MONOCYTES NFR BLD: 10 % (ref 5–13)
NEUTS SEG # BLD: 3 K/UL (ref 1.8–8)
NEUTS SEG NFR BLD: 47 % (ref 32–75)
NRBC # BLD: 0 K/UL (ref 0–0.01)
NRBC BLD-RTO: 0 PER 100 WBC
PLATELET # BLD AUTO: 293 K/UL (ref 150–400)
PMV BLD AUTO: 10.4 FL (ref 8.9–12.9)
POTASSIUM SERPL-SCNC: 4.7 MMOL/L (ref 3.5–5.1)
PROT SERPL-MCNC: 6.8 G/DL (ref 6.4–8.2)
PSA SERPL-MCNC: 0 NG/ML (ref 0.01–4)
RBC # BLD AUTO: 4.46 M/UL (ref 4.1–5.7)
SODIUM SERPL-SCNC: 141 MMOL/L (ref 136–145)
TRIGL SERPL-MCNC: 104 MG/DL (ref ?–150)
VLDLC SERPL CALC-MCNC: 20.8 MG/DL
WBC # BLD AUTO: 6.3 K/UL (ref 4.1–11.1)

## 2020-12-09 LAB
TESTOST FREE SERPL-MCNC: 12.2 PG/ML (ref 6.6–18.1)
TESTOST SERPL-MCNC: 635 NG/DL (ref 264–916)

## 2021-01-21 DIAGNOSIS — G47.00 INSOMNIA, UNSPECIFIED TYPE: ICD-10-CM

## 2021-01-22 RX ORDER — ZOLPIDEM TARTRATE 5 MG/1
TABLET ORAL
Qty: 30 TAB | Refills: 0 | Status: SHIPPED | OUTPATIENT
Start: 2021-01-22 | End: 2021-09-02

## 2021-09-02 DIAGNOSIS — G47.00 INSOMNIA, UNSPECIFIED TYPE: ICD-10-CM

## 2021-09-02 RX ORDER — ATORVASTATIN CALCIUM 10 MG/1
TABLET, FILM COATED ORAL
Qty: 90 TABLET | Refills: 3 | Status: SHIPPED | OUTPATIENT
Start: 2021-09-02 | End: 2022-02-21 | Stop reason: SDUPTHER

## 2021-09-02 RX ORDER — ZOLPIDEM TARTRATE 5 MG/1
TABLET ORAL
Qty: 30 TABLET | Refills: 0 | Status: SHIPPED | OUTPATIENT
Start: 2021-09-02 | End: 2022-09-15 | Stop reason: SDUPTHER

## 2021-12-21 ENCOUNTER — OFFICE VISIT (OUTPATIENT)
Dept: INTERNAL MEDICINE CLINIC | Age: 76
End: 2021-12-21
Payer: MEDICARE

## 2021-12-21 VITALS
DIASTOLIC BLOOD PRESSURE: 70 MMHG | HEART RATE: 62 BPM | RESPIRATION RATE: 16 BRPM | OXYGEN SATURATION: 100 % | SYSTOLIC BLOOD PRESSURE: 118 MMHG | HEIGHT: 74 IN | BODY MASS INDEX: 22.05 KG/M2 | TEMPERATURE: 98.5 F | WEIGHT: 171.8 LBS

## 2021-12-21 DIAGNOSIS — C61 PROSTATE CANCER (HCC): ICD-10-CM

## 2021-12-21 DIAGNOSIS — I70.0 AORTIC CALCIFICATION (HCC): Primary | ICD-10-CM

## 2021-12-21 DIAGNOSIS — E78.5 DYSLIPIDEMIA: ICD-10-CM

## 2021-12-21 DIAGNOSIS — R53.82 CHRONIC FATIGUE: ICD-10-CM

## 2021-12-21 DIAGNOSIS — F32.A MILD DEPRESSION: ICD-10-CM

## 2021-12-21 DIAGNOSIS — K43.9 VENTRAL HERNIA WITHOUT OBSTRUCTION OR GANGRENE: ICD-10-CM

## 2021-12-21 PROCEDURE — G0463 HOSPITAL OUTPT CLINIC VISIT: HCPCS | Performed by: INTERNAL MEDICINE

## 2021-12-21 PROCEDURE — G0439 PPPS, SUBSEQ VISIT: HCPCS | Performed by: INTERNAL MEDICINE

## 2021-12-21 PROCEDURE — 99214 OFFICE O/P EST MOD 30 MIN: CPT | Performed by: INTERNAL MEDICINE

## 2021-12-21 PROCEDURE — G8536 NO DOC ELDER MAL SCRN: HCPCS | Performed by: INTERNAL MEDICINE

## 2021-12-21 PROCEDURE — 1101F PT FALLS ASSESS-DOCD LE1/YR: CPT | Performed by: INTERNAL MEDICINE

## 2021-12-21 PROCEDURE — G9717 DOC PT DX DEP/BP F/U NT REQ: HCPCS | Performed by: INTERNAL MEDICINE

## 2021-12-21 PROCEDURE — 3017F COLORECTAL CA SCREEN DOC REV: CPT | Performed by: INTERNAL MEDICINE

## 2021-12-21 PROCEDURE — G8427 DOCREV CUR MEDS BY ELIG CLIN: HCPCS | Performed by: INTERNAL MEDICINE

## 2021-12-21 PROCEDURE — G8420 CALC BMI NORM PARAMETERS: HCPCS | Performed by: INTERNAL MEDICINE

## 2021-12-21 RX ORDER — TIMOLOL MALEATE 5 MG/ML
SOLUTION/ DROPS OPHTHALMIC
COMMUNITY
Start: 2021-06-11 | End: 2022-07-18

## 2021-12-21 NOTE — PROGRESS NOTES
This is the Subsequent Medicare Annual Wellness Exam, performed 12 months or more after the Initial AWV or the last Subsequent AWV    I have reviewed the patient's medical history in detail and updated the computerized patient record. Assessment/Plan   Education and counseling provided:  Are appropriate based on today's review and evaluation         Depression Risk Factor Screening     3 most recent PHQ Screens 5/28/2020   PHQ Not Done -   Little interest or pleasure in doing things Not at all   Feeling down, depressed, irritable, or hopeless Not at all   Total Score PHQ 2 0       Alcohol Risk Screen    Do you average more than 1 drink per night or more than 7 drinks a week: In the past three months have you have had more than 4 drinks containing alcohol on one occasion:         Functional Ability and Level of Safety    Hearing: The patient wears hearing aids. Activities of Daily Living: The home contains: no safety equipment. Patient does total self care      Ambulation: with no difficulty     Fall Risk:  Fall Risk Assessment, last 12 mths 12/21/2021   Able to walk? Yes   Fall in past 12 months? 0   Do you feel unsteady?  0   Are you worried about falling 0      Abuse Screen:  Patient is not abused       Cognitive Screening    Has your family/caregiver stated any concerns about your memory: no     Cognitive Screening: Normal - Verbal Fluency Test    Health Maintenance Due     Health Maintenance Due   Topic Date Due    Shingrix Vaccine Age 50> (1 of 2) Never done    DTaP/Tdap/Td series (2 - Td or Tdap) 09/14/2020    Flu Vaccine (1) 09/01/2021    COVID-19 Vaccine (3 - Booster for Moderna series) 10/07/2021    Lipid Screen  12/08/2021       Patient Care Team   Patient Care Team:  Bin Rossi MD as PCP - General  Bin Rossi MD as PCP - Darin Silva Provider    History     Patient Active Problem List   Diagnosis Code    Anxiety F41.9    Pulmonary nodule R91.1    Incidental lung nodule, > 3mm and < 8mm R91.1    Anxiety associated with depression F41.8    Prostate cancer ([facility] [address] C61    Schwannoma of spinal cord (HCC) D33.4    Dyslipidemia, goal LDL below 100 E78.5    Prehypertension R03.0    Aortic calcification (HCC) I70.0    Mild depression (HCC) F32.0    Adenomatous polyp of ascending colon D12.2    Ventral hernia without obstruction or gangrene K43.9     Past Medical History:   Diagnosis Date    Anxiety associated with depression     Depression     GERD (gastroesophageal reflux disease)     Glaucoma     w/uveitis & detached retina    Incidental lung nodule, > 3mm and < 8mm 6/14/2011    Optic neuritis, left     Prostate cancer [facility])     prostatectomy 9/2011    Schwannoma of spinal cord ([facility] [address] 11/1/2012      Past Surgical History:   Procedure Laterality Date    ENDOSCOPY, COLON, DIAGNOSTIC  2009    HX CATARACT REMOVAL      BILATERAL    HX GI  2003    splenectomy    HX HEENT  2009    DETACHED RETINA 2X LEFT EYE    HX HEENT  2009    CORNEA TRANSPLANT LEFT EYE    HX HERNIA REPAIR  1970, 1994    BILATERAL    HX HERNIA REPAIR  9-    Diag. Lap. and repair of incisional hernia with mesh    HX PROSTATECTOMY      HX UROLOGICAL  2011    robotic prostatectomy    [place] ABDOMEN SURGERY PROC UNLISTED  2003    SPLENECTOMY    [place] LAP,PROSTATECTOMY,RADICAL,W/NERVE SPARE,INCL ROBOTIC  9/2011    [place] REPAIR INCISIONAL HERNIA,REDUCIBLE  09/25/11    diagnostic lap. wih mesh     Current Outpatient Medications   Medication Sig Dispense Refill    timolol (TIMOPTIC) 0.5 % ophthalmic solution       atorvastatin (LIPITOR) 10 mg tablet TAKE ONE TABLET BY MOUTH DAILY 90 Tablet 3    zolpidem (AMBIEN) 5 mg tablet TAKE ONE TABLET BY MOUTH ONCE AT BEDTIME IF NEEDED FOR SLEEP 30 Tablet 0    sertraline (ZOLOFT) 50 mg tablet TAKE ONE TABLET BY MOUTH DAILY 90 Tab 3    cyanocobalamin, vitamin B-12, 2,500 mcg lozg by SubLINGual route.       co-enzyme Q-10 (CO Q-10) 100 mg capsule Take 200 mg by mouth daily.  Vit C-Vit E-Copper-ZnOx-Lutein (PRESERVISION) 226-200-5 mg-unit-mg Cap Take  by mouth daily.  DORZOLAMIDE HCL/TIMOLOL MALEAT (COSOPT OP) Apply  to eye. (Patient not taking: Reported on 2021)       Allergies   Allergen Reactions    Iodinated Contrast Media Nausea and Vomiting    Iodine Nausea and Vomiting    Morphine Hives    Pcn [Penicillins] Hives    Tamiflu [Oseltamivir] Rash       Family History   Problem Relation Age of Onset    Liver Disease Mother     Heart Disease Father 68    Elevated Lipids Father     Cancer Sister      Social History     Tobacco Use    Smoking status: Former Smoker     Years: 8.00     Quit date: 1975     Years since quittin.2    Smokeless tobacco: Never Used   Substance Use Topics    Alcohol use: Yes     Comment: RARE     This gentleman is 78 y.o. Generally pretty good health. Some mild underlying anxiety. Takes a statin due to hyperlipidemia. He has never had any vascular disease. Family history of lung cancer. He quit smoking 43 years ago. No symptoms. Has not been screened. Vague discomfort in his belly at times. More musculoskeletal.  More right upper quadrant when he bends or twists. He is pretty physically active. His review of systems is negative for claudication, chest pain, chest tightness, shortness of breath or serious dyspnea of exertion. I have reviewed other testing. He is seeing an urologist about vague testicular tenderness. He is asking for PSA by Urology when he has a follow up visit with them in a couple of weeks. Still has hernia abd wall  But reducible    Ros Gooden MD       1. Aortic calcification (HCC)  Started statin when noted no claudication  - CBC WITH AUTOMATED DIFF; Future  - LIPID PANEL; Future  - METABOLIC PANEL, COMPREHENSIVE; Future  - MAGNESIUM; Future    2. Ventral hernia without obstruction or gangrene  Reducible wants to avoid surgery    3. Chronic fatigue  depression    4. Mild depression (HCC)  Stable on ssri    5. Prostate cancer ([facility] [address]  remission    6. Dyslipidemia  follow  - CBC WITH AUTOMATED DIFF; Future  - LIPID PANEL; Future  - METABOLIC PANEL, COMPREHENSIVE; Future  - MAGNESIUM;  Future

## 2021-12-21 NOTE — PROGRESS NOTES
Patient has been informed of any other discussion outside the parameters of the physical could result in other charges including a co pay, patient verbalized understanding. 1. Have you been to the ER, urgent care clinic since your last visit? Hospitalized since your last visit?no    2. Have you seen or consulted any other health care providers outside of the [address] [place] [address] since your last visit? Include any pap smears or colon screening.  Yes    Chief Complaint   Patient presents with   [facility] Wellness Visit

## 2021-12-21 NOTE — PATIENT INSTRUCTIONS
Medicare Wellness Visit, Male    The best way to live healthy is to have a lifestyle where you eat a well-balanced diet, exercise regularly, limit alcohol use, and quit all forms of tobacco/nicotine, if applicable. Regular preventive services are another way to keep healthy. Preventive services (vaccines, screening tests, monitoring & exams) can help personalize your care plan, which helps you manage your own care. Screening tests can find health problems at the earliest stages, when they are easiest to treat. [facility] follows the current, evidence-based guidelines published by the [facility] ([facility]) when recommending preventive services for our patients. Because we follow these guidelines, sometimes recommendations change over time as research supports it. (For example, a prostate screening blood test is no longer routinely recommended for men with no symptoms). Of course, you and your doctor may decide to screen more often for some diseases, based on your risk and co-morbidities (chronic disease you are already diagnosed with). Preventive services for you include:  - Medicare offers their members a free annual wellness visit, which is time for you and your primary care provider to discuss and plan for your preventive service needs. Take advantage of this benefit every year!  -All adults over age 72 should receive the recommended pneumonia vaccines. Current USPSTF guidelines recommend a series of two vaccines for the best pneumonia protection.   -All adults should have a flu vaccine yearly and tetanus vaccine every 10 years.  -All adults age 48 and older should receive the shingles vaccines (series of two vaccines).        -All adults age 38-68 who are overweight should have a diabetes screening test once every three years.   -Other screening tests & preventive services for persons with diabetes include: an eye exam to screen for diabetic retinopathy, a kidney function test, a foot exam, and stricter control over your cholesterol.   -Cardiovascular screening for adults with routine risk involves an electrocardiogram (ECG) at intervals determined by the provider.   -Colorectal cancer screening should be done for adults age 54-65 with no increased risk factors for colorectal cancer. There are a number of acceptable methods of screening for this type of cancer. Each test has its own benefits and drawbacks. Discuss with your provider what is most appropriate for you during your annual wellness visit. The different tests include: colonoscopy (considered the best screening method), a fecal occult blood test, a fecal DNA test, and sigmoidoscopy.  -All adults born between [facility] should be screened once for Hepatitis C.  -An Abdominal Aortic Aneurysm (AAA) Screening is recommended for men age 73-68 who has ever smoked in their lifetime.      Here is a list of your current Health Maintenance items (your personalized list of preventive services) with a due date:  Health Maintenance Due   Topic Date Due    Shingles Vaccine (1 of 2) Never done    DTaP/Tdap/Td  (2 - Td or Tdap) 09/14/2020    Yearly Flu Vaccine (1) 09/01/2021    COVID-19 Vaccine (3 - Booster for Moderna series) 10/07/2021    Cholesterol Test   12/08/2021

## 2022-01-11 ENCOUNTER — TRANSCRIBE ORDER (OUTPATIENT)
Dept: SCHEDULING | Age: 77
End: 2022-01-11

## 2022-01-11 DIAGNOSIS — C61 PROSTATE CANCER (HCC): Primary | ICD-10-CM

## 2022-01-17 ENCOUNTER — HOSPITAL ENCOUNTER (OUTPATIENT)
Dept: MRI IMAGING | Age: 77
Discharge: HOME | End: 2022-01-17
Attending: UROLOGY

## 2022-01-17 DIAGNOSIS — C61 PROSTATE CANCER (HCC): ICD-10-CM

## 2022-01-19 ENCOUNTER — HOSPITAL ENCOUNTER (OUTPATIENT)
Dept: MRI IMAGING | Age: 77
Discharge: HOME | End: 2022-01-19
Attending: UROLOGY
Payer: MEDICARE

## 2022-01-19 PROCEDURE — 72197 MRI PELVIS W/O & W/DYE: CPT

## 2022-01-19 PROCEDURE — 74011250636 HC RX REV CODE- 250/636

## 2022-01-19 PROCEDURE — 74011000258 HC RX REV CODE- 258: Performed by: UROLOGY

## 2022-01-19 PROCEDURE — A9576 INJ PROHANCE MULTIPACK: HCPCS

## 2022-01-19 RX ADMIN — SODIUM CHLORIDE 100 ML: 9 INJECTION, SOLUTION INTRAVENOUS at 12:05

## 2022-02-14 RX ORDER — SERTRALINE HYDROCHLORIDE 50 MG/1
50 TABLET, FILM COATED ORAL DAILY
Qty: 90 TABLET | Refills: 1 | Status: SHIPPED | OUTPATIENT
Start: 2022-02-14 | End: 2022-09-12

## 2022-02-21 RX ORDER — ATORVASTATIN CALCIUM 10 MG/1
10 TABLET, FILM COATED ORAL DAILY
Qty: 90 TABLET | Refills: 3 | Status: SHIPPED | OUTPATIENT
Start: 2022-02-21

## 2022-02-21 NOTE — TELEPHONE ENCOUNTER
Requested Prescriptions     Pending Prescriptions Disp Refills    atorvastatin (LIPITOR) 10 mg tablet 90 Tablet 3     Sig: Take 1 Tablet by mouth daily.

## 2022-02-21 NOTE — TELEPHONE ENCOUNTER
Future Appointments:  Future Appointments   Date Time Provider Obdulio Levi   12/28/2022 10:00 AM Luci Washington MD [facility]        Last Appointment With Me:  12/21/2021     Requested Prescriptions     Pending Prescriptions Disp Refills    atorvastatin (LIPITOR) 10 mg tablet 90 Tablet 3     Sig: Take 1 Tablet by mouth daily.

## 2022-03-18 PROBLEM — F32.A MILD DEPRESSION: Status: ACTIVE | Noted: 2018-12-04

## 2022-03-19 PROBLEM — K43.9 VENTRAL HERNIA WITHOUT OBSTRUCTION OR GANGRENE: Status: ACTIVE | Noted: 2020-12-07

## 2022-03-19 PROBLEM — D12.2 ADENOMATOUS POLYP OF ASCENDING COLON: Status: ACTIVE | Noted: 2019-12-05

## 2022-03-20 PROBLEM — I70.0 AORTIC CALCIFICATION (HCC): Status: ACTIVE | Noted: 2018-04-18

## 2022-05-12 ENCOUNTER — OFFICE VISIT (OUTPATIENT)
Dept: SURGERY | Age: 77
End: 2022-05-12
Payer: MEDICARE

## 2022-05-12 VITALS
BODY MASS INDEX: 22.39 KG/M2 | WEIGHT: 174.5 LBS | HEART RATE: 62 BPM | RESPIRATION RATE: 20 BRPM | HEIGHT: 74 IN | OXYGEN SATURATION: 98 % | DIASTOLIC BLOOD PRESSURE: 76 MMHG | TEMPERATURE: 98.4 F | SYSTOLIC BLOOD PRESSURE: 134 MMHG

## 2022-05-12 DIAGNOSIS — K43.2 INCISIONAL HERNIA, WITHOUT OBSTRUCTION OR GANGRENE: Primary | ICD-10-CM

## 2022-05-12 DIAGNOSIS — K42.9 UMBILICAL HERNIA WITHOUT OBSTRUCTION AND WITHOUT GANGRENE: ICD-10-CM

## 2022-05-12 PROCEDURE — G9717 DOC PT DX DEP/BP F/U NT REQ: HCPCS | Performed by: SURGERY

## 2022-05-12 PROCEDURE — 99203 OFFICE O/P NEW LOW 30 MIN: CPT | Performed by: SURGERY

## 2022-05-12 PROCEDURE — G8536 NO DOC ELDER MAL SCRN: HCPCS | Performed by: SURGERY

## 2022-05-12 PROCEDURE — G8427 DOCREV CUR MEDS BY ELIG CLIN: HCPCS | Performed by: SURGERY

## 2022-05-12 PROCEDURE — G8420 CALC BMI NORM PARAMETERS: HCPCS | Performed by: SURGERY

## 2022-05-12 PROCEDURE — 1101F PT FALLS ASSESS-DOCD LE1/YR: CPT | Performed by: SURGERY

## 2022-05-12 PROCEDURE — 3017F COLORECTAL CA SCREEN DOC REV: CPT | Performed by: SURGERY

## 2022-05-12 NOTE — PROGRESS NOTES
1. Have you been to the ER, urgent care clinic since your last visit? Hospitalized since your last visit? new patient    2. Have you seen or consulted any other health care providers outside of the [address] [place] [address] since your last visit? Include any pap smears or colon screening.  New patient

## 2022-05-13 NOTE — PROGRESS NOTES
Surgery Consult    Subjective:      Jazzy Randhawa is a 78 y.o. male who is being seen for incisional hernia. Patient has symptoms of bulging, mass, which are made worse with coughing, lifting. Symptoms were first noted several months ago. Pain is dull, intermittent. Lump is reducible. Patient does not have symptoms of chronic constipation, chronic cough, difficulty urinating. Patient has a history of previous hernia surgery (bilateral open inguinal hernia repairs). He is nearly 10 years following robotic assisted laparoscopic prostatectomy, complicated by herniation of small bowel into preperitoneal space with resultant small bowel obstruction, managed with laparoscopic reduction, repair of defect with Veritas mesh. He denies nausea, vomiting, fever, chills, exertional chest pain, or wheezing.      Patient Active Problem List    Diagnosis Date Noted    Ventral hernia without obstruction or gangrene 12/07/2020    Adenomatous polyp of ascending colon 12/05/2019    Mild depression 12/04/2018    Aortic calcification ([address] 04/18/2018    Prehypertension 11/28/2016    Dyslipidemia, goal LDL below 100 11/04/2012    Schwannoma of spinal cord ([address] 11/01/2012    Anxiety associated with depression     Prostate cancer ([address]     Incidental lung nodule, > 3mm and < 8mm 06/14/2011    Pulmonary nodule 01/28/2011    Anxiety 09/14/2010      Past Medical History:   Diagnosis Date    Anxiety associated with depression     Depression     GERD (gastroesophageal reflux disease)     Glaucoma     w/uveitis & detached retina    Hypercholesterolemia     Incidental lung nodule, > 3mm and < 8mm 6/14/2011    Optic neuritis, left     Prostate cancer [facility])     prostatectomy 9/2011    Schwannoma of spinal cord ([address] 11/1/2012     Past Surgical History:   Procedure Laterality Date    ENDOSCOPY, COLON, DIAGNOSTIC  2009    HX CATARACT REMOVAL      BILATERAL    HX GI  2003    splenectomy    HX HEENT  2009    DETACHED RETINA 2X LEFT EYE    HX HEENT      CORNEA TRANSPLANT LEFT EYE    HX HERNIA REPAIR  ,     BILATERAL    HX HERNIA REPAIR  2011    Diag. Lap. and repair of incisional hernia with mesh    HX PROSTATECTOMY      HX UROLOGICAL      robotic prostatectomy    MA ABDOMEN SURGERY PROC UNLISTED      SPLENECTOMY    MA LAP,PROSTATECTOMY,RADICAL,W/NERVE SPARE,INCL ROBOTIC  2011    MA REPAIR INCISIONAL HERNIA,REDUCIBLE  11    diagnostic lap. wih mesh     Family History   Problem Relation Age of Onset    Liver Disease Mother     Heart Disease Father 68    Elevated Lipids Father     Cancer Sister       Social History     Tobacco Use    Smoking status: Former Smoker     Years: 8.00     Quit date: 1975     Years since quittin.6    Smokeless tobacco: Never Used   Substance Use Topics    Alcohol use: Yes     Comment: RARE      Current Outpatient Medications   Medication Sig    atorvastatin (LIPITOR) 10 mg tablet Take 1 Tablet by mouth daily.  sertraline (ZOLOFT) 50 mg tablet Take 1 Tablet by mouth daily.  timolol (TIMOPTIC) 0.5 % ophthalmic solution     zolpidem (AMBIEN) 5 mg tablet TAKE ONE TABLET BY MOUTH ONCE AT BEDTIME IF NEEDED FOR SLEEP    cyanocobalamin, vitamin B-12, 2,500 mcg lozg by SubLINGual route.  co-enzyme Q-10 (CO Q-10) 100 mg capsule Take 200 mg by mouth daily.  DORZOLAMIDE HCL/TIMOLOL MALEAT (COSOPT OP) Apply  to eye.  Vit C-Vit E-Copper-ZnOx-Lutein (PRESERVISION) 226-200-5 mg-unit-mg Cap Take  by mouth daily. No current facility-administered medications for this visit. Allergies   Allergen Reactions    Iodinated Contrast Media Nausea and Vomiting    Iodine Nausea and Vomiting    Morphine Hives    Pcn [Penicillins] Hives    Tamiflu [Oseltamivir] Rash        Review of Systems:  A complete review of systems was negative except as noted in the HPI.     Objective:     Visit Vitals  /76   Pulse 62   Temp 98.4 °F (36.9 °C)   Resp 20 Ht 6' 2\" (1.88 m)   Wt 174 lb 8 oz (79.2 kg)   SpO2 98%   BMI 22.40 kg/m²       Physical Exam:  GENERAL: alert, cooperative, no distress, appears stated age, EYE: negative, LYMPHATIC: No cervical or supraclavicular adenopathy. THROAT & NECK: normal, LUNG: clear to auscultation bilaterally, HEART: regular rate and rhythm, S1, S2 normal, no murmur. ABDOMEN: NABS, nondistended, soft. Well-healed laparoscopic scars. Tender, reducible bulge at prostatectomy extraction incision in epigastrium. EXTREMITIES:  extremities normal, atraumatic, no cyanosis or edema, SKIN: Normal., NEUROLOGIC: negative. Assessment:     Incisional hernia which is tender to palpation. He is interested in robotic assisted laparoscopic preperitoneal repair with mesh. Plan:     1. Discussed possibility of incarceration, strangulation, enlargement in size over time, and the risk of emergency surgery in the face of strangulation. Also discussed the risks of surgery including recurrence, infection, possibility of bleeding or conversion to open procedure, and the risks of general anesthetic. The patient understands the risks; all questions were answered to the patient's satisfaction.

## 2022-05-13 NOTE — PATIENT INSTRUCTIONS
Abdominal Hernia Repair: Before Your Surgery  What is abdominal hernia repair surgery? An abdominal hernia repair is a type of surgery. It fixes a problem called a hernia. A hernia is a bulge under the skin in your belly. It happens when you have a weak spot in your belly muscles and a piece of your intestines or tissues pokes through your muscles. This can cause pain. You may notice the pain most when you lift something heavy. You can have a hernia near your belly button. Or it may be in a scar from an earlier surgery. To fix it, the doctor will do one of two kinds of surgery. In open surgery, the doctor makes one cut near the hernia. This cut is called an incision. In laparoscopic surgery, the doctor makes several very small incisions and uses a thin, lighted scope and small tools. In either type of surgery, the doctor pushes the bulge back in place, if needed. Then the doctor sews the healthy tissue back together. Often the doctor patches the weak spot with a piece of material.  Laparoscopic surgery leaves several small scars. Open surgery leaves one long scar. The scars fade with time. You will probably need to take 1 to 2 weeks off from work. But if your job requires heavy lifting or other physical work, you may need to take 4 to 6 weeks off. Follow-up care is a key part of your treatment and safety. Be sure to make and go to all appointments, and call your doctor if you are having problems. It's also a good idea to know your test results and keep a list of the medicines you take. How do you prepare for the surgery? Surgery can be stressful. This information will help you understand what you can expect. And it will help you safely prepare for surgery. Preparing for surgery    · Be sure you have someone to take you home.  Anesthesia and pain medicine will make it unsafe for you to drive or get home on your own.     · Understand exactly what surgery is planned, along with the risks, benefits, and other options.     · Tell your doctor ALL the medicines, vitamins, supplements, and herbal remedies you take. Some may increase the risk of problems during your surgery. Your doctor will tell you if you should stop taking any of them before the surgery and how soon to do it.     · If you take aspirin or some other blood thinner, ask your doctor if you should stop taking it before your surgery. Make sure that you understand exactly what your doctor wants you to do. These medicines increase the risk of bleeding.     · Make sure your doctor and the hospital have a copy of your advance directive. If you don't have one, you may want to prepare one. It lets others know your health care wishes. It's a good thing to have before any type of surgery or procedure. .   What happens on the day of surgery? · Follow the instructions exactly about when to stop eating and drinking. If you don't, your surgery may be canceled. If your doctor told you to take your medicines on the day of surgery, take them with only a sip of water.     · Take a bath or shower before you come in for your surgery. Do not apply lotions, perfumes, deodorants, or nail polish.     · Do not shave the surgical site yourself.     · Take off all jewelry and piercings. And take out contact lenses, if you wear them. At the hospital or surgery center   · Bring a picture ID.     · The area for surgery is often marked to make sure there are no errors.     · You will be kept comfortable and safe by your anesthesia provider. You will be asleep during the surgery.     · The surgery will take 30 minutes to 2 hours. It depends on how large the hernia is and where it is. When should you call your doctor? · You have questions or concerns.     · You don't understand how to prepare for your surgery.     · You become ill before the surgery (such as fever, flu, or a cold).     · You need to reschedule or have changed your mind about having the surgery.    Where can you learn more?  Go to http://www.gray.com/  Enter U288 in the search box to learn more about \"Abdominal Hernia Repair: Before Your Surgery. \"  Current as of: September 8, 2021               Content Version: 13.2  © [id number] [employer]. Care instructions adapted under license by [employer] (which disclaims liability or warranty for this information). If you have questions about a medical condition or this instruction, always ask your healthcare professional. [place] [address] any warranty or liability for your use of this information.

## 2022-05-26 ENCOUNTER — TELEPHONE (OUTPATIENT)
Dept: SURGERY | Age: 77
End: 2022-05-26

## 2022-05-26 NOTE — TELEPHONE ENCOUNTER
Patient is wanting to know if he needs any additional testing or imaging regarding his hernias before surgery. I informed patient that Dr. Sola Griffin did not include any additional testing in his posting but he will be needing to go to PAT before surgery.

## 2022-05-26 NOTE — TELEPHONE ENCOUNTER
I called the patient back and he wanted to know if Dr Marcello Mello wanted to do an ultrasound before he did his hernia surgeries to locate the hernias. I said he examined you and he said yes he felt them but he thought maybe he might want to do a scan. He said it was just a thought he had and wanted to suggest it. I said you have an incisional hernia and an umbilical hernia? And he said yes. I said if he felt them he generally does not get a scan but I will forward this message to him. I let him know that Dr Marcello Mello was out until next month. Pt in agreement.

## 2022-07-06 NOTE — PROGRESS NOTES
This is the Subsequent Medicare Annual Wellness Exam, performed 12 months or more after the Initial AWV or the last Subsequent AWV    I have reviewed the patient's medical history in detail and updated the computerized patient record. Depression Risk Factor Screening:     3 most recent PHQ Screens 5/28/2020   PHQ Not Done -   Little interest or pleasure in doing things Not at all   Feeling down, depressed, irritable, or hopeless Not at all   Total Score PHQ 2 0       Alcohol Risk Screen   Do you average more than 1 drink per night or more than 7 drinks a week: In the past three months have you have had more than 4 drinks containing alcohol on one occasion:         Functional Ability and Level of Safety:   Hearing: Hearing is good. Activities of Daily Living: The home contains: no safety equipment. Patient does total self care     Ambulation: with no difficulty     Fall Risk:  Fall Risk Assessment, last 12 mths 12/7/2020   Able to walk? Yes   Fall in past 12 months? No     Abuse Screen:  Patient is not abused       Cognitive Screening   Has your family/caregiver stated any concerns about your memory: no     Cognitive Screening: Normal - Verbal Fluency Test    Assessment/Plan   Education and counseling provided:  Are appropriate based on today's review and evaluation  End-of-Life planning (with patient's consent)    Diagnoses and all orders for this visit:    1. Aortic calcification (HCC)    2. Prehypertension    3. Dyslipidemia, goal LDL below 100  -     CBC WITH AUTOMATED DIFF; Future  -     LIPID PANEL; Future  -     METABOLIC PANEL, COMPREHENSIVE; Future    4. Prostate cancer ([facility] [address]  -     PSA, DIAGNOSTIC (PROSTATE SPECIFIC AG); Future    5. Chronic fatigue  -     TESTOSTERONE, FREE & TOTAL; Future    6. Ventral hernia without obstruction or gangrene    7.  Medicare annual wellness visit, subsequent        Health Maintenance Due     Health Maintenance Due   Topic Date Due    Shingrix Vaccine Age 49> (1 of 2) 10/06/1997    Colorectal Cancer Screening Combo  07/01/2019    GLAUCOMA SCREENING Q2Y  09/14/2020    DTaP/Tdap/Td series (2 - Td) 09/14/2020    Medicare Yearly Exam  12/05/2020       Patient Care Team   Patient Care Team:  Janusz Holden MD as PCP - General  Janusz Holden MD as PCP - [facility] Empaneled Provider    History     Patient Active Problem List   Diagnosis Code    Anxiety F41.9    Pulmonary nodule R91.1    Incidental lung nodule, > 3mm and < 8mm R91.1    Anxiety associated with depression F41.8    Prostate cancer ([address] C61    Schwannoma of spinal cord ([address] D33.4    Dyslipidemia, goal LDL below 100 E78.5    Prehypertension R03.0    Aortic calcification (HCC) I70.0    Mild depression ([address] F32.0    Adenomatous polyp of ascending colon D12.2    Ventral hernia without obstruction or gangrene K43.9     Past Medical History:   Diagnosis Date    Anxiety associated with depression     Depression     GERD (gastroesophageal reflux disease)     Glaucoma     w/uveitis & detached retina    Incidental lung nodule, > 3mm and < 8mm 6/14/2011    Optic neuritis, left     Prostate cancer [facility])     prostatectomy 9/2011    Schwannoma of spinal cord ([address] 11/1/2012      Past Surgical History:   Procedure Laterality Date    ABDOMEN SURGERY PROC UNLISTED  2003    SPLENECTOMY    ENDOSCOPY, COLON, DIAGNOSTIC  2009    HX CATARACT REMOVAL      BILATERAL    HX GI  2003    splenectomy    HX HEENT  2009    DETACHED RETINA 2X LEFT EYE    HX HEENT  2009    CORNEA TRANSPLANT LEFT EYE    HX HERNIA REPAIR  1970, 1994    BILATERAL    HX HERNIA REPAIR  9-    Diag.  Lap. and repair of incisional hernia with mesh    HX PROSTATECTOMY      HX UROLOGICAL  2011    robotic prostatectomy    CT LAP,PROSTATECTOMY,RADICAL,W/NERVE SPARE,INCL ROBOTIC  9/2011    REPAIR INCISIONAL HERNIA,REDUCIBLE  09/25/11    diagnostic lap. wih mesh     Current Outpatient Medications   Medication Sig Dispense Refill  zolpidem (AMBIEN) 5 mg tablet TAKE ONE TABLET BY MOUTH AT BEDTIME AS NEEDED FOR SLEEP 30 Tab 0    cyanocobalamin, vitamin B-12, 2,500 mcg lozg by SubLINGual route.  co-enzyme Q-10 (CO Q-10) 100 mg capsule Take 200 mg by mouth daily.  DORZOLAMIDE HCL/TIMOLOL MALEAT (COSOPT OP) Apply  to eye.  Vit C-Vit E-Copper-ZnOx-Lutein (PRESERVISION) 226-200-5 mg-unit-mg Cap Take  by mouth daily.  sertraline (ZOLOFT) 50 mg tablet TAKE ONE TABLET BY MOUTH DAILY 90 Tab 1     Allergies   Allergen Reactions    Iodinated Contrast Media Nausea and Vomiting    Iodine Nausea and Vomiting    Morphine Hives    Pcn [Penicillins] Hives    Tamiflu [Oseltamivir] Rash       Family History   Problem Relation Age of Onset    Liver Disease Mother     Heart Disease Father 68    Elevated Lipids Father     Cancer Sister      Social History     Tobacco Use    Smoking status: Former Smoker     Years: 8.00     Last attempt to quit: 1975     Years since quittin.2    Smokeless tobacco: Never Used   Substance Use Topics    Alcohol use: Yes     Comment: RARE     SUBJECTIVE: Jacquie Mcdowell is a 69 y.o. male seen for a follow up visit; he has hyperlipidemia and borderline HTN. Current Outpatient Medications   Medication Sig Dispense Refill    sertraline (ZOLOFT) 50 mg tablet TAKE ONE TABLET BY MOUTH DAILY (Patient taking differently: 25 mg. TAKE ONE TABLET BY MOUTH DAILY) 90 Tab 0    zolpidem (AMBIEN) 5 mg tablet TAKE ONE TABLET BY MOUTH AT BEDTIME AS NEEDED FOR SLEEP 30 Tab 0    cyanocobalamin, vitamin B-12, 2,500 mcg lozg by SubLINGual route.  atorvastatin (LIPITOR) 10 mg tablet TAKE ONE TABLET BY MOUTH DAILY (Patient taking differently: 5 mg.) 90 Tab 0    co-enzyme Q-10 (CO Q-10) 100 mg capsule Take 200 mg by mouth daily.  aspirin 81 mg chewable tablet Take 1 Tab by mouth daily. 100 Tab 11    DORZOLAMIDE HCL/TIMOLOL MALEAT (COSOPT OP) Apply  to eye.       Vit C-Vit E-Copper-ZnOx-Lutein (PRESERVISION) 529-365-5 mg-unit-mg Cap Take  by mouth daily. Patient Active Problem List   Diagnosis Code    Anxiety F41.9    Pulmonary nodule R91.1    Incidental lung nodule, > 3mm and < 8mm R91.1    Anxiety associated with depression F41.8    Prostate cancer ([facility] [address] C61    Schwannoma of spinal cord ([facility] [address] D33.4    Dyslipidemia, goal LDL below 100 E78.5    Prehypertension R03.0    Aortic calcification (HCC) I70.0    Mild depression (HCC) F32.0    Adenomatous polyp of ascending colon D12.2     System Review: Cardiovascular ROS - taking medications as instructed, no medication side effects noted, home BP monitoring in range of 188'L systolic over 62'L diastolic, no TIA's, no chest pain on exertion, no dyspnea on exertion, no swelling of ankles. New concerns: swelling  Right mid abdomen wall no heavy lifting  Not on any antihypertensive. Chronic anxiety well controlled low dose SSTI  OBJECTIVE:  Visit Vitals  /75   Pulse 60   Temp 96.9 °F (36.1 °C) (Oral)   Resp 18   Ht 6' 2\" (1.88 m)   Wt 176 lb (79.8 kg)   SpO2 98%   BMI 22.60 kg/m²      Appearance: alert, well appearing, and in no distress, oriented to person, place, and time, normal appearing weight and well hydrated. General exam: CVS exam BP noted to be well controlled today in office, S1, S2 normal, no gallop, no murmur, chest clear, no JVD, no HSM, no edema. Abdominal exam: soft, nontender, nondistended, no masses or organomegaly  HERNIA EXAM: ventrall hernia, reducible, nontender. Lab review: orders written for new lab studies as appropriate; see orders, no lab studies available for review at time of visit. ASSESSMENT:  hypertension reasonably well controlled, borderline controlled, hyperlipidemia asymptomatic, reasonably well controlled, advise LDL at least < 100  Maybe better at 70.     PLAN:  current treatment plan is effective, no change in therapy  lab results and schedule of future lab studies reviewed with patient  repeat labs ordered prior to next appointment  orders and follow up as documented in patient record  reviewed diet, exercise and weight control  recommended sodium restriction. Diagnoses and all orders for this visit:    1. Aortic calcification (HCC)  Known on statina and good diet    2. Prehypertension  Ok mtoday    3. Dyslipidemia, goal LDL below 100  On statin  - CBC WITH AUTOMATED DIFF; Future  - LIPID PANEL; Future  - METABOLIC PANEL, COMPREHENSIVE; Future    4. Prostate cancer ([facility] [address]  No recurrenc e  - PSA, DIAGNOSTIC (PROSTATE SPECIFIC AG); Future    5. Chronic fatigue  Wants to know his status  - TESTOSTERONE, FREE & TOTAL; Future    6. Ventral hernia without obstruction or gangrene  No reason for anfy treatment now   If symptoms see surgeon    7.  Medicare annual wellness visit, subsequent General Sunscreen Counseling: I recommended a broad spectrum sunscreen with a SPF of 30 or higher.  I explained that SPF 30 sunscreens block approximately 97 percent of the sun's harmful rays.  Sunscreens should be applied at least 15 minutes prior to expected sun exposure and then every 2 hours after that as long as sun exposure continues. If swimming or exercising sunscreen should be reapplied every 45 minutes to an hour after getting wet or sweating.  One ounce, or the equivalent of a shot glass full of sunscreen, is adequate to protect the skin not covered by a bathing suit. I also recommended a lip balm with a sunscreen as well. Sun protective clothing can be used in lieu of sunscreen but must be worn the entire time you are exposed to the sun's rays. Detail Level: Detailed

## 2022-07-18 ENCOUNTER — HOSPITAL ENCOUNTER (OUTPATIENT)
Dept: GENERAL RADIOLOGY | Age: 77
Discharge: HOME | End: 2022-07-18
Attending: SURGERY
Payer: MEDICARE

## 2022-07-18 ENCOUNTER — HOSPITAL ENCOUNTER (OUTPATIENT)
Dept: PREADMISSION TESTING | Age: 77
Discharge: HOME | End: 2022-07-18
Payer: MEDICARE

## 2022-07-18 VITALS
HEART RATE: 58 BPM | OXYGEN SATURATION: 97 % | BODY MASS INDEX: 23.65 KG/M2 | DIASTOLIC BLOOD PRESSURE: 80 MMHG | HEIGHT: 72 IN | WEIGHT: 174.6 LBS | SYSTOLIC BLOOD PRESSURE: 136 MMHG | TEMPERATURE: 97.7 F

## 2022-07-18 LAB
ALBUMIN SERPL-MCNC: 3.4 G/DL (ref 3.5–5)
ALBUMIN/GLOB SERPL: 1.1 {RATIO} (ref 1.1–2.2)
ALP SERPL-CCNC: 119 U/L (ref 45–117)
ALT SERPL-CCNC: 29 U/L (ref 12–78)
ANION GAP SERPL CALC-SCNC: 5 MMOL/L (ref 5–15)
APPEARANCE UR: CLEAR
AST SERPL-CCNC: 21 U/L (ref 15–37)
BACTERIA URNS QL MICRO: NEGATIVE /HPF
BASOPHILS # BLD: 0.1 K/UL (ref 0–0.1)
BASOPHILS NFR BLD: 2 % (ref 0–1)
BILIRUB SERPL-MCNC: 0.5 MG/DL (ref 0.2–1)
BILIRUB UR QL: NEGATIVE
BUN SERPL-MCNC: 19 MG/DL (ref 6–20)
BUN/CREAT SERPL: 22 (ref 12–20)
CALCIUM SERPL-MCNC: 8.9 MG/DL (ref 8.5–10.1)
CHLORIDE SERPL-SCNC: 105 MMOL/L (ref 97–108)
CO2 SERPL-SCNC: 29 MMOL/L (ref 21–32)
COLOR UR: ABNORMAL
CREAT SERPL-MCNC: 0.88 MG/DL (ref 0.7–1.3)
DIFFERENTIAL METHOD BLD: ABNORMAL
EOSINOPHIL # BLD: 0.2 K/UL (ref 0–0.4)
EOSINOPHIL NFR BLD: 3 % (ref 0–7)
EPITH CASTS URNS QL MICRO: ABNORMAL /LPF
ERYTHROCYTE [DISTWIDTH] IN BLOOD BY AUTOMATED COUNT: 12.8 % (ref 11.5–14.5)
GLOBULIN SER CALC-MCNC: 3.1 G/DL (ref 2–4)
GLUCOSE SERPL-MCNC: 76 MG/DL (ref 65–100)
GLUCOSE UR STRIP.AUTO-MCNC: NEGATIVE MG/DL
HCT VFR BLD AUTO: 44.2 % (ref 36.6–50.3)
HGB BLD-MCNC: 14.7 G/DL (ref 12.1–17)
HGB UR QL STRIP: ABNORMAL
HYALINE CASTS URNS QL MICRO: ABNORMAL /LPF (ref 0–5)
IMM GRANULOCYTES # BLD AUTO: 0 K/UL (ref 0–0.04)
IMM GRANULOCYTES NFR BLD AUTO: 0 % (ref 0–0.5)
KETONES UR QL STRIP.AUTO: NEGATIVE MG/DL
LEUKOCYTE ESTERASE UR QL STRIP.AUTO: NEGATIVE
LYMPHOCYTES # BLD: 2.1 K/UL (ref 0.8–3.5)
LYMPHOCYTES NFR BLD: 29 % (ref 12–49)
MAGNESIUM SERPL-MCNC: 2.3 MG/DL (ref 1.6–2.4)
MCH RBC QN AUTO: 33.3 PG (ref 26–34)
MCHC RBC AUTO-ENTMCNC: 33.3 G/DL (ref 30–36.5)
MCV RBC AUTO: 100.2 FL (ref 80–99)
MONOCYTES # BLD: 0.8 K/UL (ref 0–1)
MONOCYTES NFR BLD: 12 % (ref 5–13)
NEUTS SEG # BLD: 3.8 K/UL (ref 1.8–8)
NEUTS SEG NFR BLD: 54 % (ref 32–75)
NITRITE UR QL STRIP.AUTO: NEGATIVE
NRBC # BLD: 0 K/UL (ref 0–0.01)
NRBC BLD-RTO: 0 PER 100 WBC
PH UR STRIP: 6 [PH] (ref 5–8)
PLATELET # BLD AUTO: 292 K/UL (ref 150–400)
PMV BLD AUTO: 9.8 FL (ref 8.9–12.9)
POTASSIUM SERPL-SCNC: 4.8 MMOL/L (ref 3.5–5.1)
PROT SERPL-MCNC: 6.5 G/DL (ref 6.4–8.2)
PROT UR STRIP-MCNC: NEGATIVE MG/DL
RBC # BLD AUTO: 4.41 M/UL (ref 4.1–5.7)
RBC #/AREA URNS HPF: ABNORMAL /HPF (ref 0–5)
SODIUM SERPL-SCNC: 139 MMOL/L (ref 136–145)
SP GR UR REFRACTOMETRY: 1.01 (ref 1–1.03)
UA: UC IF INDICATED,UAUC: ABNORMAL
UROBILINOGEN UR QL STRIP.AUTO: 0.2 EU/DL (ref 0.2–1)
WBC # BLD AUTO: 7.1 K/UL (ref 4.1–11.1)
WBC URNS QL MICRO: ABNORMAL /HPF (ref 0–4)

## 2022-07-18 PROCEDURE — 36415 COLL VENOUS BLD VENIPUNCTURE: CPT

## 2022-07-18 PROCEDURE — 85025 COMPLETE CBC W/AUTO DIFF WBC: CPT

## 2022-07-18 PROCEDURE — 81001 URINALYSIS AUTO W/SCOPE: CPT

## 2022-07-18 PROCEDURE — 71046 X-RAY EXAM CHEST 2 VIEWS: CPT

## 2022-07-18 PROCEDURE — 80053 COMPREHEN METABOLIC PANEL: CPT

## 2022-07-18 PROCEDURE — 83735 ASSAY OF MAGNESIUM: CPT

## 2022-07-18 PROCEDURE — 93005 ELECTROCARDIOGRAM TRACING: CPT

## 2022-07-18 RX ORDER — LANOLIN ALCOHOL/MO/W.PET/CERES
2500 CREAM (GRAM) TOPICAL DAILY
COMMUNITY

## 2022-07-18 NOTE — PERIOP NOTES
PAT instructions reviewed with patient and given the opportunity to ask questions. Patient given surgical site information FAQs handout and reviewed. Patient given two bottles CHG soap and instruction sheet, instructions for use reviewed with patient. Patient instructed re: check-in procedure for day of surgery.

## 2022-07-18 NOTE — PERIOP NOTES
[address] INSTRUCTIONS    Surgery Date:   7-22-22    Your surgeon's office or [facility] staff will call you between 4 PM- 8 PM the day before surgery with your arrival time. If your surgery is on a Monday, you will receive a call the preceding Friday. 1. Please report to [facility] Patient Access/Admitting on the 1st floor. Bring your insurance card, photo identification, and any copayment ( if applicable). 2. If you are going home the same day of your surgery, you must have a responsible adult to drive you home. You need to have a responsible adult to stay with you the first 24 hours after surgery and you should not drive a car for 24 hours following your surgery. 3. Do NOT eat any solid foods after midnight the night before surgery including candy, mint or gum. You may drink clear liquids from midnight until 1 hour prior to your arrival. You may drink up to 12 ounces at one time every 4 hours. Please note special instructions, if applicable, below for medications. 4. Do NOT drink alcohol or smoke 24 hours before surgery. STOP smoking for 14 days prior as it helps with breathing and healing after surgery. 5. If you are being admitted to the hospital, please leave personal belongings/luggage in your car until you have an assigned hospital room number. 6. Please wear comfortable clothes. Wear your glasses instead of contacts. We ask that all money, jewelry and valuables be left at home. Wear no make up, particularly mascara, the day of surgery. 7.  All body piercings, rings, and jewelry need to be removed and left at home. Please remove any nail polish or artifical nails from your fingernails. Please wear your hair loose or down. Please no pony-tails, buns, or any metal hair accessories. If you shower the morning of surgery, please do not apply any lotions or powders afterwards. You may wear deodorant, unless having breast surgery.   Do not shave any body area within 24 hours of your surgery. 8. Please follow all instructions to avoid any potential surgical cancellation. 9. Should your physical condition change, (i.e. fever, cold, flu, etc.) please notify your surgeon as soon as possible. 10. It is important to be on time. If a situation occurs where you may be delayed, please call:  [phone] / [phone] on the day of surgery. 11. The Preadmission Testing staff can be reached at [phone]. 12. Special instructions: NONE      Current Outpatient Medications   Medication Sig    cyanocobalamin (Vitamin B-12) 1,000 mcg tablet Take 2,500 mcg by mouth daily.  atorvastatin (LIPITOR) 10 mg tablet Take 1 Tablet by mouth daily. (Patient taking differently: Take 10 mg by mouth daily (with dinner). )    sertraline (ZOLOFT) 50 mg tablet Take 1 Tablet by mouth daily.  zolpidem (AMBIEN) 5 mg tablet TAKE ONE TABLET BY MOUTH ONCE AT BEDTIME IF NEEDED FOR SLEEP    co-enzyme Q-10 (CO Q-10) 100 mg capsule Take 200 mg by mouth daily.  DORZOLAMIDE HCL/TIMOLOL MALEAT (COSOPT OP) Administer 1 Drop to right eye daily.  Vit C-Vit E-Copper-ZnOx-Lutein (PRESERVISION) 226-200-5 mg-unit-mg Cap Take 1 Tablet by mouth two (2) times a day. No current facility-administered medications for this encounter. 1. YOU MUST ONLY TAKE THESE MEDICATIONS THE MORNING OF SURGERY WITH A SIP OF WATER: SERTRALINE, USE COSOPT EYEDROP  2. MEDICATIONS TO TAKE THE MORNING OF SURGERY ONLY IF NEEDED: N/A  3. STOP these prescription medications BEFORE Surgery: PRESERVISION, COQ10, VIT B12  4. Ask your surgeon/prescribing physician about when/if to STOP taking these medications: N/A  5. Stop all vitamins, herbal medicines and Aspirin containing products 7 days prior to surgery. Stop any non-steroidal anti-inflammatory drugs (i.e. Ibuprofen, Naproxen, Advil, Aleve) 3 days before surgery. You may take Tylenol.     6. If you are currently taking Plavix, Coumadin,or any other blood-thinning/anticoagulant medication contact your prescribing physician for instructions. Eating and Drinking Before Surgery     You may eat a regular dinner at the usual time on the day before your surgery.  Do NOT eat any solid foods after midnight unless your arrival time at the hospital is 3pm or later.  You may drink clear liquids only from 12 midnight until 1 hours prior to your arrival time at the hospital on the day of your surgery. Do NOT drink alcohol.  Clear liquids include:  o Water  o Fruit juices without pulp( i.e. apple juice)  o Carbonated beverages  o Black coffee (no cream/milk)  o Tea (no cream/milk)  o Gatorade   You may drink up to 12-16 ounces at one time every 4 hours between the hours of midnight and 1 hour before your arrival time at the hospital. Example- if your arrival time at the hospital is 6am, you may drink 12-16 ounces of clear liquids no later than 5am.   If your arrival time at the hospital is 3pm or later, you may eat a light breakfast before 8am.   A light breakfast includes:  o Toast or bagel (no butter)  o Black coffee (no cream/milk)  o Tea (no cream/milk)  o Fruit juices without pulp ( i.e. apple juice)  o Do NOT eat meat, eggs, vegetables or fruit   If you have any questions, please contact your surgeon's office. Preventing Infections Before and After - Your Surgery    IMPORTANT INSTRUCTIONS    You play an important role in your health and preparation for surgery. To reduce the germs on your skin you will need to shower with CHG soap (Chorhexidine gluconate 4%) two times before surgery. CHG soap (Hibiclens, Hex-A-Clens or store brand)   CHG soap will be provided at your Preadmission Testing (PAT) appointment.  If you do not have a PAT appointment before surgery, you may arrange to  CHG soap from our office or purchase CHG soap at a pharmacy, grocery or department store.  You need to purchase TWO 4 ounce bottles to use for your 2 showers. Steps to follow:  1.  Wash your hair with your normal shampoo and your body with regular soap and rinse well to remove shampoo and soap from your skin. 2. Wet a clean washcloth and turn off the shower. 3. Put CHG soap on washcloth and apply to your entire body from the neck down. Do not use on your head, face or private parts(genitals). Do not use CHG soap on open sores, wounds or areas of skin irritation. 4. Wash you body gently for 5 minutes. Do not wash your skin too hard. This soap does not create lather. Pay special attention to your underarms and from your belly button to your feet. 5. Turn the shower back on and rinse well to get CHG soap off your body. 6. Pat your skin dry with a clean, dry towel. Do not apply lotions or moisturizer. 7. Put on clean clothes and sleep on fresh bed sheets and do not allow pets to sleep with you. Shower with CHG soap 2 times before your surgery   The evening before your surgery   The morning of your surgery      Tips to help prevent infections after your surgery:  1. Protect your surgical wound from germs:  ? Hand washing is the most important thing you and your caregivers can do to prevent infections. ? Keep your bandage clean and dry! ? Do not touch your surgical wound. 2. Use clean, freshly washed towels and washcloths every time you shower; do not share bath linens with others. 3. Until your surgical wound is healed, wear clothing and sleep on bed linens each day that are clean and freshly washed. 4. Do not allow pets to sleep in your bed with you or touch your surgical wound. 5. Do not smoke - smoking delays wound healing. This may be a good time to stop smoking. 6. If you have diabetes, it is important for you to manage your blood sugar levels properly before your surgery as well as after your surgery. Poorly managed blood sugar levels slow down wound healing and prevent you from healing completely.               Patient Information Regarding COVID Restrictions      Day of Procedure     Please park in the parking deck or any designated visitor parking lot.  Enter the facility through the [facility] the [facility].   On the day of surgery, please provide the cell phone number of the person who will be waiting for you to the Patient Access representative at the time of registration.  Please wear a mask on the day of your procedure.  We are now allowing two designated visitors per stay. Pediatric patients may have 2 designated visitors. These two people may come in with you on the day of your procedure.  The designated visitor must also wear a mask.  Once your procedure and the immediate recovery period is completed, a nurse in the recovery area will contact your designated visitor to inform them of your room number or to otherwise review other pertinent information regarding your care.  Social distancing practices are to be adhered to in waiting areas and the cafeteria. The patient was contacted  in person. He verbalized understanding of all instructions does not  need reinforcement.

## 2022-07-19 NOTE — PERIOP NOTES
CC MSG SENT TO ELLIS DARNELL RE ABNORMAL CXR    HI, ELLIS,     I KNOW THE RADIOLOGIST SENT A PERFECT SERVE TEXT TO DR ALMARAZ YESTERDAY ABOUT THE ABNORMAL CXR. JUST WANTED TO MAKE SURE HE RECEIVED IT AND ALSO SEE IF ANYTHING WILL CHANGE ABOUT THE SURGERY. [address] [facility]:    RICHA Luke, D[place]  Good morning, Dr Keith Leahy got the message and spoke to Radiologist and will call the patient, nothing changes as now in regards to his surgery.  Thank you, Celso Richardson

## 2022-07-20 LAB
ATRIAL RATE: 60 BPM
CALCULATED P AXIS, ECG09: 70 DEGREES
CALCULATED R AXIS, ECG10: -19 DEGREES
CALCULATED T AXIS, ECG11: 20 DEGREES
DIAGNOSIS, 93000: NORMAL
P-R INTERVAL, ECG05: 172 MS
Q-T INTERVAL, ECG07: 412 MS
QRS DURATION, ECG06: 104 MS
QTC CALCULATION (BEZET), ECG08: 412 MS
VENTRICULAR RATE, ECG03: 60 BPM

## 2022-07-22 ENCOUNTER — HOSPITAL ENCOUNTER (OUTPATIENT)
Age: 77
Setting detail: OUTPATIENT SURGERY
Discharge: HOME | End: 2022-07-22
Attending: SURGERY | Admitting: SURGERY
Payer: MEDICARE

## 2022-07-22 ENCOUNTER — ANESTHESIA EVENT (OUTPATIENT)
Dept: SURGERY | Age: 77
End: 2022-07-22
Payer: MEDICARE

## 2022-07-22 ENCOUNTER — ANESTHESIA (OUTPATIENT)
Dept: SURGERY | Age: 77
End: 2022-07-22
Payer: MEDICARE

## 2022-07-22 VITALS
DIASTOLIC BLOOD PRESSURE: 70 MMHG | BODY MASS INDEX: 23.65 KG/M2 | OXYGEN SATURATION: 95 % | RESPIRATION RATE: 12 BRPM | HEIGHT: 72 IN | TEMPERATURE: 97.2 F | HEART RATE: 82 BPM | WEIGHT: 174.6 LBS | SYSTOLIC BLOOD PRESSURE: 108 MMHG

## 2022-07-22 DIAGNOSIS — R22.2 CHEST MASS: Primary | ICD-10-CM

## 2022-07-22 DIAGNOSIS — Z98.890 S/P REPAIR OF VENTRAL HERNIA: Primary | ICD-10-CM

## 2022-07-22 DIAGNOSIS — Z87.19 S/P REPAIR OF VENTRAL HERNIA: Primary | ICD-10-CM

## 2022-07-22 DIAGNOSIS — K43.9 VENTRAL HERNIA WITHOUT OBSTRUCTION OR GANGRENE: ICD-10-CM

## 2022-07-22 PROCEDURE — 77030020703 HC SEAL CANN DISP INTU -B: Performed by: SURGERY

## 2022-07-22 PROCEDURE — 77030010507 HC ADH SKN DERMBND J&J -B: Performed by: SURGERY

## 2022-07-22 PROCEDURE — 77030040361 HC SLV COMPR DVT MDII -B: Performed by: SURGERY

## 2022-07-22 PROCEDURE — 74011250636 HC RX REV CODE- 250/636: Performed by: NURSE ANESTHETIST, CERTIFIED REGISTERED

## 2022-07-22 PROCEDURE — 77030014008 HC SPNG HEMSTAT J&J -C: Performed by: SURGERY

## 2022-07-22 PROCEDURE — 77030008756 HC TU IRR SUC STRY -B: Performed by: SURGERY

## 2022-07-22 PROCEDURE — 77030008684 HC TU ET CUF COVD -B: Performed by: ANESTHESIOLOGY

## 2022-07-22 PROCEDURE — 76010000877 HC OR TIME 2.5 TO 3HR INTENSV - TIER 2: Performed by: SURGERY

## 2022-07-22 PROCEDURE — 77030035279 HC SEAL VSL ENDOWR XI INTU -I2: Performed by: SURGERY

## 2022-07-22 PROCEDURE — 77030002933 HC SUT MCRYL J&J -A: Performed by: SURGERY

## 2022-07-22 PROCEDURE — 77030035489 HC REDUCR CANN ENDOWR INTU -C: Performed by: SURGERY

## 2022-07-22 PROCEDURE — 74011250636 HC RX REV CODE- 250/636: Performed by: ANESTHESIOLOGY

## 2022-07-22 PROCEDURE — 77030036554: Performed by: SURGERY

## 2022-07-22 PROCEDURE — 77030031139 HC SUT VCRL2 J&J -A: Performed by: SURGERY

## 2022-07-22 PROCEDURE — 77030008603 HC TRCR ENDOSC EPATH J&J -C: Performed by: SURGERY

## 2022-07-22 PROCEDURE — C1781 MESH (IMPLANTABLE): HCPCS | Performed by: SURGERY

## 2022-07-22 PROCEDURE — 77030040922 HC BLNKT HYPOTHRM STRY -A

## 2022-07-22 PROCEDURE — 77030041238 HC TBNG INSUF MDII -A: Performed by: SURGERY

## 2022-07-22 PROCEDURE — S2900 ROBOTIC SURGICAL SYSTEM: HCPCS | Performed by: SURGERY

## 2022-07-22 PROCEDURE — 76210000020 HC REC RM PH II FIRST 0.5 HR: Performed by: SURGERY

## 2022-07-22 PROCEDURE — 77030022704 HC SUT VLOC COVD -B: Performed by: SURGERY

## 2022-07-22 PROCEDURE — 74011000250 HC RX REV CODE- 250: Performed by: SURGERY

## 2022-07-22 PROCEDURE — 74011250637 HC RX REV CODE- 250/637: Performed by: ANESTHESIOLOGY

## 2022-07-22 PROCEDURE — 77030034154 HC SHR COAG HARM ACE J&J -F: Performed by: SURGERY

## 2022-07-22 PROCEDURE — 2709999900 HC NON-CHARGEABLE SUPPLY: Performed by: SURGERY

## 2022-07-22 PROCEDURE — 49655 PR LAP, INCISIONAL HERNIA REPAIR,INCARCERATED: CPT | Performed by: SURGERY

## 2022-07-22 PROCEDURE — 77030012770 HC TRCR OPT FX AMR -B: Performed by: SURGERY

## 2022-07-22 PROCEDURE — 76060000036 HC ANESTHESIA 2.5 TO 3 HR: Performed by: SURGERY

## 2022-07-22 PROCEDURE — 74011000250 HC RX REV CODE- 250: Performed by: NURSE ANESTHETIST, CERTIFIED REGISTERED

## 2022-07-22 PROCEDURE — 77030016151 HC PROTCTR LNS DFOG COVD -B: Performed by: SURGERY

## 2022-07-22 PROCEDURE — 76210000001 HC OR PH I REC 2.5 TO 3 HR: Performed by: SURGERY

## 2022-07-22 PROCEDURE — 77030035277 HC OBTRTR BLDELSS DISP INTU -B: Performed by: SURGERY

## 2022-07-22 PROCEDURE — 74011250636 HC RX REV CODE- 250/636: Performed by: SURGERY

## 2022-07-22 PROCEDURE — 77030039895 HC SYST SMK EVAC LAP COVD -B: Performed by: SURGERY

## 2022-07-22 DEVICE — MESH HERN W6XL6IN INGUINAL POLYPR SQ L PORE MFIL SFT KNIT: Type: IMPLANTABLE DEVICE | Site: ABDOMEN | Status: FUNCTIONAL

## 2022-07-22 RX ORDER — SODIUM CHLORIDE 0.9 % (FLUSH) 0.9 %
5-40 SYRINGE (ML) INJECTION AS NEEDED
Status: DISCONTINUED | OUTPATIENT
Start: 2022-07-22 | End: 2022-07-22 | Stop reason: HOSPADM

## 2022-07-22 RX ORDER — ROCURONIUM BROMIDE 10 MG/ML
INJECTION, SOLUTION INTRAVENOUS AS NEEDED
Status: DISCONTINUED | OUTPATIENT
Start: 2022-07-22 | End: 2022-07-22 | Stop reason: HOSPADM

## 2022-07-22 RX ORDER — FENTANYL CITRATE 50 UG/ML
INJECTION, SOLUTION INTRAMUSCULAR; INTRAVENOUS AS NEEDED
Status: DISCONTINUED | OUTPATIENT
Start: 2022-07-22 | End: 2022-07-22 | Stop reason: HOSPADM

## 2022-07-22 RX ORDER — SODIUM CHLORIDE 0.9 % (FLUSH) 0.9 %
5-40 SYRINGE (ML) INJECTION EVERY 8 HOURS
Status: DISCONTINUED | OUTPATIENT
Start: 2022-07-22 | End: 2022-07-22 | Stop reason: HOSPADM

## 2022-07-22 RX ORDER — ROPIVACAINE HYDROCHLORIDE 5 MG/ML
30 INJECTION, SOLUTION EPIDURAL; INFILTRATION; PERINEURAL AS NEEDED
Status: DISCONTINUED | OUTPATIENT
Start: 2022-07-22 | End: 2022-07-22 | Stop reason: HOSPADM

## 2022-07-22 RX ORDER — HYDROMORPHONE HYDROCHLORIDE 2 MG/ML
INJECTION, SOLUTION INTRAMUSCULAR; INTRAVENOUS; SUBCUTANEOUS AS NEEDED
Status: DISCONTINUED | OUTPATIENT
Start: 2022-07-22 | End: 2022-07-22 | Stop reason: HOSPADM

## 2022-07-22 RX ORDER — MIDAZOLAM HYDROCHLORIDE 1 MG/ML
1 INJECTION, SOLUTION INTRAMUSCULAR; INTRAVENOUS AS NEEDED
Status: DISCONTINUED | OUTPATIENT
Start: 2022-07-22 | End: 2022-07-22 | Stop reason: HOSPADM

## 2022-07-22 RX ORDER — GLYCOPYRROLATE 0.2 MG/ML
INJECTION INTRAMUSCULAR; INTRAVENOUS AS NEEDED
Status: DISCONTINUED | OUTPATIENT
Start: 2022-07-22 | End: 2022-07-22 | Stop reason: HOSPADM

## 2022-07-22 RX ORDER — KETAMINE HCL IN 0.9 % NACL 50 MG/5 ML
SYRINGE (ML) INTRAVENOUS AS NEEDED
Status: DISCONTINUED | OUTPATIENT
Start: 2022-07-22 | End: 2022-07-22 | Stop reason: HOSPADM

## 2022-07-22 RX ORDER — CETIRIZINE HYDROCHLORIDE 10 MG/1
10 CAPSULE, LIQUID FILLED ORAL
COMMUNITY

## 2022-07-22 RX ORDER — POLYETHYLENE GLYCOL 3350 17 G/17G
17 POWDER, FOR SOLUTION ORAL DAILY
Qty: 20 PACKET | Refills: 0 | Status: SHIPPED | OUTPATIENT
Start: 2022-07-22 | End: 2022-08-11

## 2022-07-22 RX ORDER — DIPHENHYDRAMINE HYDROCHLORIDE 50 MG/ML
12.5 INJECTION, SOLUTION INTRAMUSCULAR; INTRAVENOUS AS NEEDED
Status: DISCONTINUED | OUTPATIENT
Start: 2022-07-22 | End: 2022-07-22 | Stop reason: HOSPADM

## 2022-07-22 RX ORDER — SODIUM CHLORIDE, SODIUM LACTATE, POTASSIUM CHLORIDE, CALCIUM CHLORIDE 600; 310; 30; 20 MG/100ML; MG/100ML; MG/100ML; MG/100ML
INJECTION, SOLUTION INTRAVENOUS
Status: DISCONTINUED | OUTPATIENT
Start: 2022-07-22 | End: 2022-07-22 | Stop reason: HOSPADM

## 2022-07-22 RX ORDER — ACETAMINOPHEN 325 MG/1
650 TABLET ORAL ONCE
Status: COMPLETED | OUTPATIENT
Start: 2022-07-22 | End: 2022-07-22

## 2022-07-22 RX ORDER — DEXAMETHASONE SODIUM PHOSPHATE 4 MG/ML
INJECTION, SOLUTION INTRA-ARTICULAR; INTRALESIONAL; INTRAMUSCULAR; INTRAVENOUS; SOFT TISSUE AS NEEDED
Status: DISCONTINUED | OUTPATIENT
Start: 2022-07-22 | End: 2022-07-22 | Stop reason: HOSPADM

## 2022-07-22 RX ORDER — ONDANSETRON 2 MG/ML
INJECTION INTRAMUSCULAR; INTRAVENOUS AS NEEDED
Status: DISCONTINUED | OUTPATIENT
Start: 2022-07-22 | End: 2022-07-22 | Stop reason: HOSPADM

## 2022-07-22 RX ORDER — ONDANSETRON 2 MG/ML
4 INJECTION INTRAMUSCULAR; INTRAVENOUS AS NEEDED
Status: DISCONTINUED | OUTPATIENT
Start: 2022-07-22 | End: 2022-07-22 | Stop reason: HOSPADM

## 2022-07-22 RX ORDER — FENTANYL CITRATE 50 UG/ML
25 INJECTION, SOLUTION INTRAMUSCULAR; INTRAVENOUS
Status: DISCONTINUED | OUTPATIENT
Start: 2022-07-22 | End: 2022-07-22 | Stop reason: HOSPADM

## 2022-07-22 RX ORDER — HYDROCODONE BITARTRATE AND ACETAMINOPHEN 5; 325 MG/1; MG/1
1 TABLET ORAL
Qty: 12 TABLET | Refills: 0 | Status: SHIPPED | OUTPATIENT
Start: 2022-07-22 | End: 2022-07-25

## 2022-07-22 RX ORDER — MIDAZOLAM HYDROCHLORIDE 1 MG/ML
INJECTION, SOLUTION INTRAMUSCULAR; INTRAVENOUS AS NEEDED
Status: DISCONTINUED | OUTPATIENT
Start: 2022-07-22 | End: 2022-07-22 | Stop reason: HOSPADM

## 2022-07-22 RX ORDER — LIDOCAINE HYDROCHLORIDE 10 MG/ML
0.1 INJECTION, SOLUTION EPIDURAL; INFILTRATION; INTRACAUDAL; PERINEURAL AS NEEDED
Status: DISCONTINUED | OUTPATIENT
Start: 2022-07-22 | End: 2022-07-22 | Stop reason: HOSPADM

## 2022-07-22 RX ORDER — NORETHINDRONE AND ETHINYL ESTRADIOL 0.5-0.035
KIT ORAL AS NEEDED
Status: DISCONTINUED | OUTPATIENT
Start: 2022-07-22 | End: 2022-07-22 | Stop reason: HOSPADM

## 2022-07-22 RX ORDER — MIDAZOLAM HYDROCHLORIDE 1 MG/ML
0.5 INJECTION, SOLUTION INTRAMUSCULAR; INTRAVENOUS
Status: DISCONTINUED | OUTPATIENT
Start: 2022-07-22 | End: 2022-07-22 | Stop reason: HOSPADM

## 2022-07-22 RX ORDER — LIDOCAINE HYDROCHLORIDE 20 MG/ML
INJECTION, SOLUTION EPIDURAL; INFILTRATION; INTRACAUDAL; PERINEURAL AS NEEDED
Status: DISCONTINUED | OUTPATIENT
Start: 2022-07-22 | End: 2022-07-22 | Stop reason: HOSPADM

## 2022-07-22 RX ORDER — OXYCODONE HYDROCHLORIDE 5 MG/1
5 TABLET ORAL AS NEEDED
Status: DISCONTINUED | OUTPATIENT
Start: 2022-07-22 | End: 2022-07-22 | Stop reason: HOSPADM

## 2022-07-22 RX ORDER — BUPIVACAINE HYDROCHLORIDE 5 MG/ML
50 INJECTION, SOLUTION EPIDURAL; INTRACAUDAL ONCE
Status: COMPLETED | OUTPATIENT
Start: 2022-07-22 | End: 2022-07-22

## 2022-07-22 RX ORDER — PROPOFOL 10 MG/ML
INJECTION, EMULSION INTRAVENOUS AS NEEDED
Status: DISCONTINUED | OUTPATIENT
Start: 2022-07-22 | End: 2022-07-22 | Stop reason: HOSPADM

## 2022-07-22 RX ORDER — SODIUM CHLORIDE, SODIUM LACTATE, POTASSIUM CHLORIDE, CALCIUM CHLORIDE 600; 310; 30; 20 MG/100ML; MG/100ML; MG/100ML; MG/100ML
100 INJECTION, SOLUTION INTRAVENOUS CONTINUOUS
Status: DISCONTINUED | OUTPATIENT
Start: 2022-07-22 | End: 2022-07-22 | Stop reason: HOSPADM

## 2022-07-22 RX ORDER — PHENYLEPHRINE HCL IN 0.9% NACL 0.4MG/10ML
SYRINGE (ML) INTRAVENOUS AS NEEDED
Status: DISCONTINUED | OUTPATIENT
Start: 2022-07-22 | End: 2022-07-22 | Stop reason: HOSPADM

## 2022-07-22 RX ORDER — SODIUM CHLORIDE 9 MG/ML
25 INJECTION, SOLUTION INTRAVENOUS CONTINUOUS
Status: DISCONTINUED | OUTPATIENT
Start: 2022-07-22 | End: 2022-07-22 | Stop reason: HOSPADM

## 2022-07-22 RX ORDER — SODIUM CHLORIDE, SODIUM LACTATE, POTASSIUM CHLORIDE, CALCIUM CHLORIDE 600; 310; 30; 20 MG/100ML; MG/100ML; MG/100ML; MG/100ML
25 INJECTION, SOLUTION INTRAVENOUS CONTINUOUS
Status: DISCONTINUED | OUTPATIENT
Start: 2022-07-22 | End: 2022-07-22 | Stop reason: HOSPADM

## 2022-07-22 RX ORDER — FENTANYL CITRATE 50 UG/ML
50 INJECTION, SOLUTION INTRAMUSCULAR; INTRAVENOUS AS NEEDED
Status: DISCONTINUED | OUTPATIENT
Start: 2022-07-22 | End: 2022-07-22 | Stop reason: HOSPADM

## 2022-07-22 RX ORDER — HYDROMORPHONE HYDROCHLORIDE 1 MG/ML
0.2 INJECTION, SOLUTION INTRAMUSCULAR; INTRAVENOUS; SUBCUTANEOUS
Status: DISCONTINUED | OUTPATIENT
Start: 2022-07-22 | End: 2022-07-22 | Stop reason: HOSPADM

## 2022-07-22 RX ADMIN — HYDROMORPHONE HYDROCHLORIDE 0.5 MG: 2 INJECTION, SOLUTION INTRAMUSCULAR; INTRAVENOUS; SUBCUTANEOUS at 09:28

## 2022-07-22 RX ADMIN — Medication 80 MCG: at 07:48

## 2022-07-22 RX ADMIN — EPHEDRINE SULFATE 5 MG: 50 INJECTION INTRAVENOUS at 08:26

## 2022-07-22 RX ADMIN — PROPOFOL 150 MG: 10 INJECTION, EMULSION INTRAVENOUS at 07:33

## 2022-07-22 RX ADMIN — ROCURONIUM BROMIDE 50 MG: 10 SOLUTION INTRAVENOUS at 07:33

## 2022-07-22 RX ADMIN — ONDANSETRON HYDROCHLORIDE 4 MG: 2 SOLUTION INTRAMUSCULAR; INTRAVENOUS at 11:26

## 2022-07-22 RX ADMIN — DEXAMETHASONE SODIUM PHOSPHATE 8 MG: 4 INJECTION, SOLUTION INTRAMUSCULAR; INTRAVENOUS at 07:47

## 2022-07-22 RX ADMIN — EPHEDRINE SULFATE 5 MG: 50 INJECTION INTRAVENOUS at 08:10

## 2022-07-22 RX ADMIN — VANCOMYCIN HYDROCHLORIDE 1000 MG: 1 INJECTION, POWDER, LYOPHILIZED, FOR SOLUTION INTRAVENOUS at 06:38

## 2022-07-22 RX ADMIN — LIDOCAINE HYDROCHLORIDE 100 MG: 20 INJECTION, SOLUTION EPIDURAL; INFILTRATION; INTRACAUDAL; PERINEURAL at 07:33

## 2022-07-22 RX ADMIN — Medication 80 MCG: at 08:08

## 2022-07-22 RX ADMIN — ACETAMINOPHEN 650 MG: 325 TABLET ORAL at 06:26

## 2022-07-22 RX ADMIN — EPHEDRINE SULFATE 10 MG: 50 INJECTION INTRAVENOUS at 08:49

## 2022-07-22 RX ADMIN — Medication 20 MG: at 07:33

## 2022-07-22 RX ADMIN — SODIUM CHLORIDE, POTASSIUM CHLORIDE, SODIUM LACTATE AND CALCIUM CHLORIDE 25 ML/HR: 600; 310; 30; 20 INJECTION, SOLUTION INTRAVENOUS at 06:36

## 2022-07-22 RX ADMIN — EPHEDRINE SULFATE 10 MG: 50 INJECTION INTRAVENOUS at 08:32

## 2022-07-22 RX ADMIN — Medication 80 MCG: at 08:26

## 2022-07-22 RX ADMIN — SODIUM CHLORIDE, POTASSIUM CHLORIDE, SODIUM LACTATE AND CALCIUM CHLORIDE: 600; 310; 30; 20 INJECTION, SOLUTION INTRAVENOUS at 07:27

## 2022-07-22 RX ADMIN — FENTANYL CITRATE 50 MCG: 50 INJECTION, SOLUTION INTRAMUSCULAR; INTRAVENOUS at 07:59

## 2022-07-22 RX ADMIN — ONDANSETRON HYDROCHLORIDE 4 MG: 2 INJECTION, SOLUTION INTRAMUSCULAR; INTRAVENOUS at 09:52

## 2022-07-22 RX ADMIN — ROCURONIUM BROMIDE 10 MG: 10 SOLUTION INTRAVENOUS at 08:52

## 2022-07-22 RX ADMIN — SUGAMMADEX 160 MG: 100 INJECTION, SOLUTION INTRAVENOUS at 10:03

## 2022-07-22 RX ADMIN — HYDROMORPHONE HYDROCHLORIDE 0.5 MG: 1 INJECTION, SOLUTION INTRAMUSCULAR; INTRAVENOUS; SUBCUTANEOUS at 12:15

## 2022-07-22 RX ADMIN — Medication 80 MCG: at 08:32

## 2022-07-22 RX ADMIN — GLYCOPYRROLATE 0.2 MG: 0.2 INJECTION, SOLUTION INTRAMUSCULAR; INTRAVENOUS at 08:06

## 2022-07-22 RX ADMIN — EPHEDRINE SULFATE 5 MG: 50 INJECTION INTRAVENOUS at 09:35

## 2022-07-22 RX ADMIN — FENTANYL CITRATE 25 MCG: 50 INJECTION, SOLUTION INTRAMUSCULAR; INTRAVENOUS at 12:15

## 2022-07-22 RX ADMIN — FENTANYL CITRATE 50 MCG: 50 INJECTION, SOLUTION INTRAMUSCULAR; INTRAVENOUS at 07:33

## 2022-07-22 RX ADMIN — ROCURONIUM BROMIDE 10 MG: 10 SOLUTION INTRAVENOUS at 09:26

## 2022-07-22 RX ADMIN — EPHEDRINE SULFATE 10 MG: 50 INJECTION INTRAVENOUS at 09:32

## 2022-07-22 RX ADMIN — MIDAZOLAM 1 MG: 1 INJECTION INTRAMUSCULAR; INTRAVENOUS at 07:27

## 2022-07-22 RX ADMIN — FENTANYL CITRATE 25 MCG: 50 INJECTION, SOLUTION INTRAMUSCULAR; INTRAVENOUS at 12:10

## 2022-07-22 RX ADMIN — EPHEDRINE SULFATE 5 MG: 50 INJECTION INTRAVENOUS at 09:17

## 2022-07-22 RX ADMIN — Medication 80 MCG: at 07:41

## 2022-07-22 RX ADMIN — HYDROMORPHONE HYDROCHLORIDE 0.5 MG: 2 INJECTION, SOLUTION INTRAMUSCULAR; INTRAVENOUS; SUBCUTANEOUS at 09:51

## 2022-07-22 NOTE — ANESTHESIA PREPROCEDURE EVALUATION
Relevant Problems   NEUROLOGY   (+) Anxiety associated with depression   (+) Mild depression      PERSONAL HX & FAMILY HX OF CANCER   (+) Prostate cancer (HCC)       Anesthetic History   No history of anesthetic complications            Review of Systems / Medical History  Patient summary reviewed, nursing notes reviewed and pertinent labs reviewed    Pulmonary      Recent URI: resolved             Neuro/Psych         Psychiatric history     Cardiovascular  Within defined limits                Exercise tolerance: >4 METS     GI/Hepatic/Renal     GERD: well controlled           Endo/Other        Arthritis     Other Findings              Physical Exam    Airway  Mallampati: I  TM Distance: 4 - 6 cm  Neck ROM: normal range of motion   Mouth opening: Normal     Cardiovascular  Regular rate and rhythm,  S1 and S2 normal,  no murmur, click, rub, or gallop             Dental    Dentition: Upper dentition intact, Lower dentition intact and Implants     Pulmonary  Breath sounds clear to auscultation               Abdominal  GI exam deferred       Other Findings            Anesthetic Plan    ASA: 2  Anesthesia type: general          Induction: Intravenous  Anesthetic plan and risks discussed with: Patient

## 2022-07-22 NOTE — BRIEF OP NOTE
Brief Postoperative Note    Patient: Marcelo Rendon Sr  YOB: 1945  MRN: [id number]    Date of Procedure: 7/22/2022     Pre-Op Diagnosis: INCISIONAL HERNIA, UMBILICAL HERNIA    Post-Op Diagnosis: Same as preoperative diagnosis. Procedure(s):  ROBOTIC ASSISTED LAPAROSCOPIC INCISIONAL HERNIA REPAIR WITH MESH    Surgeon(s):  Kyle Bonilla MD    Surgical Assistant: Surg Asst-1: Liudmila Figueroa    Anesthesia: General     Estimated Blood Loss (mL): less than 50     Complications: None    Specimens: * No specimens in log *     Implants:   Implant Name Type Inv. Item Serial No.  Lot No. LRB No. Used Action   MESH CARRILLO SFT BARD 6X6IN --  - SNA  MESH CARRILLO SFT BARD 6X6IN --  NA BARD [id number] N/A 1 Implanted       Drains: * No LDAs found *    Findings: Umbilical defect with multiple epigastric incisional herniae; primary closure with reinforcement with 15 x 12 cm Bard SoftMesh (pre-peritoneal).     Electronically Signed by Pablito Burton MD on 7/22/2022 at 10:07 AM

## 2022-07-22 NOTE — H&P
Subjective:      Luisa Vail is a 78 y.o. male who is being seen for incisional hernia. Patient has symptoms of bulging, mass, which are made worse with coughing, lifting. Symptoms were first noted several months ago. Pain is dull, intermittent. Lump is reducible. Patient does not have symptoms of chronic constipation, chronic cough, difficulty urinating. Patient has a history of previous hernia surgery (bilateral open inguinal hernia repairs). He is nearly 10 years following robotic assisted laparoscopic prostatectomy, complicated by herniation of small bowel into preperitoneal space with resultant small bowel obstruction, managed with laparoscopic reduction, repair of defect with Veritas mesh. He denies nausea, vomiting, fever, chills, exertional chest pain, or wheezing.           Patient Active Problem List     Diagnosis Date Noted    Ventral hernia without obstruction or gangrene 12/07/2020    Adenomatous polyp of ascending colon 12/05/2019    Mild depression 12/04/2018    Aortic calcification ([address] 04/18/2018    Prehypertension 11/28/2016    Dyslipidemia, goal LDL below 100 11/04/2012    Schwannoma of spinal cord ([address] 11/01/2012    Anxiety associated with depression      Prostate cancer ([address]      Incidental lung nodule, > 3mm and < 8mm 06/14/2011    Pulmonary nodule 01/28/2011    Anxiety 09/14/2010           Past Medical History:   Diagnosis Date    Anxiety associated with depression      Depression      GERD (gastroesophageal reflux disease)      Glaucoma       w/uveitis & detached retina    Hypercholesterolemia      Incidental lung nodule, > 3mm and < 8mm 6/14/2011    Optic neuritis, left      Prostate cancer [facility])       prostatectomy 9/2011    Schwannoma of spinal cord ([address] 11/1/2012            Past Surgical History:   Procedure Laterality Date    ENDOSCOPY, COLON, DIAGNOSTIC   2009    HX CATARACT REMOVAL         BILATERAL    HX GI   2003     splenectomy    HX HEENT   2009     DETACHED RETINA 2X LEFT EYE    HX HEENT        CORNEA TRANSPLANT LEFT EYE    HX HERNIA REPAIR   ,      BILATERAL    HX HERNIA REPAIR   2011     Diag. Lap. and repair of incisional hernia with mesh    HX PROSTATECTOMY        HX UROLOGICAL        robotic prostatectomy    CO ABDOMEN SURGERY PROC UNLISTED        SPLENECTOMY    CO LAP,PROSTATECTOMY,RADICAL,W/NERVE SPARE,INCL ROBOTIC   2011    CO REPAIR INCISIONAL HERNIA,REDUCIBLE   11     diagnostic lap. wih mesh            Family History   Problem Relation Age of Onset    Liver Disease Mother      Heart Disease Father 68    Elevated Lipids Father      Cancer Sister        Social History            Tobacco Use    Smoking status: Former Smoker       Years: 8.00       Quit date: 1975       Years since quittin.6    Smokeless tobacco: Never Used   Substance Use Topics    Alcohol use: Yes       Comment: RARE           Current Outpatient Medications   Medication Sig    atorvastatin (LIPITOR) 10 mg tablet Take 1 Tablet by mouth daily. sertraline (ZOLOFT) 50 mg tablet Take 1 Tablet by mouth daily. timolol (TIMOPTIC) 0.5 % ophthalmic solution      zolpidem (AMBIEN) 5 mg tablet TAKE ONE TABLET BY MOUTH ONCE AT BEDTIME IF NEEDED FOR SLEEP    cyanocobalamin, vitamin B-12, 2,500 mcg lozg by SubLINGual route. co-enzyme Q-10 (CO Q-10) 100 mg capsule Take 200 mg by mouth daily. DORZOLAMIDE HCL/TIMOLOL MALEAT (COSOPT OP) Apply  to eye. Vit C-Vit E-Copper-ZnOx-Lutein (PRESERVISION) 226-200-5 mg-unit-mg Cap Take  by mouth daily. No current facility-administered medications for this visit. Allergies   Allergen Reactions    Iodinated Contrast Media Nausea and Vomiting    Iodine Nausea and Vomiting    Morphine Hives    Pcn [Penicillins] Hives    Tamiflu [Oseltamivir] Rash         Review of Systems:  A complete review of systems was negative except as noted in the HPI.      Objective:      Visit Vitals  /88 (BP 1 Location: Right arm, BP Patient Position: At rest)   Pulse 68   Temp 97.8 °F (36.6 °C)   Resp 15   Ht 6' (1.829 m)   Wt 174 lb 9.7 oz (79.2 kg)   SpO2 98%   BMI 23.68 kg/m²        Physical Exam:  GENERAL: alert, cooperative, no distress, appears stated age, EYE: negative, LYMPHATIC: No cervical or supraclavicular adenopathy. THROAT & NECK: normal, LUNG: clear to auscultation bilaterally, HEART: regular rate and rhythm, S1, S2 normal, no murmur. ABDOMEN: NABS, nondistended, soft. Well-healed laparoscopic scars. Tender, reducible bulge at prostatectomy extraction incision in epigastrium. EXTREMITIES:  extremities normal, atraumatic, no cyanosis or edema, SKIN: Normal., NEUROLOGIC: negative. Assessment:      Incisional hernia which is tender to palpation. He is interested in robotic assisted laparoscopic preperitoneal repair with mesh. Plan:      1. Discussed possibility of incarceration, strangulation, enlargement in size over time, and the risk of emergency surgery in the face of strangulation. Also discussed the risks of surgery including recurrence, infection, possibility of bleeding or conversion to open procedure, and the risks of general anesthetic.  The patient understands the risks; all questions were answered to the patient's satisfaction

## 2022-07-22 NOTE — OP NOTES
[address]   OPERATIVE REPORT    Name:  Hawa Yuan  MR#:  [id number]  :  1945  ACCOUNT #:  [de-identified]  DATE OF SERVICE:  2022    PREOPERATIVE DIAGNOSES:  1. Incisional hernia, supraumbilical.  2.  Umbilical hernia. POSTOPERATIVE DIAGNOSES:  1. Incisional hernia, supraumbilical.  2.  Umbilical hernia. PROCEDURE PERFORMED:  Robotic-assisted laparoscopic preperitoneal hernia repair with mesh (CPT O1009453). SURGEON:  Lindsay Solomon MD    ASSISTANT:  Laura Finley SA    ANESTHESIA:  General endotracheal.    COMPLICATIONS:  None. SPECIMENS REMOVED:  None. IMPLANTS:  15-cm x 12-cm Bard soft mesh. ESTIMATED BLOOD LOSS:  40 mL. DRAINS:  None. COUNTS:  Sponge count correct. Needle count correct. INDICATION:  The patient is a 21-year-old white male with a history of prostate cancer, managed through robotic-assisted laparoscopic prostatectomy several years ago. He has developed a tender, enlarging bulge above his umbilicus at the extraction scar. On exam, he has a tender, reducible umbilical hernia along with a tender, reducible incisional hernia in the supraumbilical area. He was taken to the operating room today for robotic-assisted laparoscopic repair. FINDINGS:  1.  Umbilical defect with multiple epigastric incisional hernias. 2.  Primary repair of all defects with reinforcement using 15-cm x 12-cm Bard soft mesh in the preperitoneal space. PROCEDURE:  The patient was identified as the correct patient in the preoperative holding area and informed consent was confirmed. After answering the patient's remaining questions, he was taken to the operating room and placed on the operating room table in the supine position. Sequential compression devices were placed on both lower extremities. Following the uneventful initiation of general anesthesia, he was carefully secured to the operating room table with safety strap in place.   All potential pressure points were padded with eggcrate. His arms were tucked to his sides. His abdomen was prepped and draped in the usual sterile fashion. Final time-out was performed and it was confirmed he had received intravenous antibiotics. A 5-mm trocar was inserted through a small right-sided skin incision using an Optiview technique. After confirming intraperitoneal location of the trocar tip, insufflation with carbon dioxide gas was initiated. Once adequate working sites had been developed, the 5-mm, 30-degree laparoscope was inserted. No signs of trocar injury were present. Adhesions from prior prostatectomy were present in the midabdominal area. Two 8-mm robotic trocars were inserted through small right-sided incisions using visual guidance with the laparoscope. The initial 5-mm trocar was upsized to an 8-mm robotic trocar using identical technique. The [address] was then docked using standard technique. Steve with electrocautery used to open the peritoneum in the area of the right semilunar line, entering the preperitoneal space. This plane was developed cephalad, to the level of the falciform ligament, and caudally, below the arcuate ligament. This plane was then developed medially, with identification of an umbilical defect containing preperitoneal fat. The fat was freed from the edges of the defect using blunt dissection and steve with cautery. Once reduced, a series of epigastric incisional hernias were identified in the supraumbilical area, each containing preperitoneal fat. Each defect was reduced of its contents, with overall identification of six different defects. This preperitoneal plane was then developed across the midline, into the left abdomen, to the level of the left semilunar line. The defects were then closed with a running 0 nonabsorbable barbed suture after decreasing the pneumoperitoneum.   Following closure, the mesh, 15 cm x 12 cm, was introduced and unrolled in the preperitoneal space. It was secured to the abdominal wall over the closure with interrupted 3-0 Vicryl sutures. After confirming satisfactory hemostasis, the peritoneal flap was closed with a running 2-0 absorbable barbed suture. Remaining needles were then removed. The viscera inspected and no signs of injury were present. Hemostasis was confirmed. The [address] was undocked after removal of the instruments, and pneumoperitoneum was released. All trocars were removed. All wounds were infiltrated with 0.5% Marcaine without epinephrine. All skin edges were reapproximated with a combination of subcuticular 4-0 Monocryl suture and Dermabond. The patient tolerated the procedure well. He was extubated in the operating room and transported to the recovery area in stable condition. The attending surgeon, Dr. Carroll Lopez, was scrubbed and present for the entire procedure.       Ameena Amador MD      [id number]  D:  07/22/2022 11:10  T:  07/22/2022 16:20  JOB #:  [id number]

## 2022-07-22 NOTE — PERIOP NOTES
I have reviewed discharge instructions in person with the patient, spouse and son using teach back method. The patient, spouse and son verbalized understanding. Medications, signs, symptoms, and side effects reviewed. Opportunity for questions and clarification allowed. Patient discharging home via private vehicle. Hard-copy of discharge instructions given to patient. Copy of discharge instructions signed and placed on chart.

## 2022-07-22 NOTE — DISCHARGE INSTRUCTIONS
______________________________________________________________________    Anesthesia Discharge Instructions    After general anesthesia or intervenous sedation, for 24 hours or while taking prescription Narcotics:  Limit your activities  Do not drive or operate hazardous machinery  If you have not urinated within 8 hours after discharge, please contact your surgeon on call. Do not make important personal or business decisions  Do not drink alcoholic beverages    Report the following to your surgeon:  Excessive pain, swelling, redness or odor of or around the surgical area  Temperature over 100.5 degrees  Nausea and vomiting lasting longer than 4 hours or if unable to take medication  Any signs of decreased circulation or nerve impairment to extremity:  Change in color, persistent numbness, tingling, coldness or increased pain.   Any questions

## 2022-07-22 NOTE — ANESTHESIA POSTPROCEDURE EVALUATION
Post-Anesthesia Evaluation and Assessment    Patient: Lorri Gan MRN: [id number]  SSN: [id number]    YOB: 1945  Age: 78 y.o. Sex: male      I have evaluated the patient and they are stable and ready for discharge from the PACU. Cardiovascular Function/Vital Signs  Visit Vitals  /66   Pulse 73   Temp 36 °C (96.8 °F)   Resp 12   Ht 6' (1.829 m)   Wt 79.2 kg (174 lb 9.7 oz)   SpO2 95%   BMI 23.68 kg/m²       Patient is status post General anesthesia for Procedure(s):  ROBOTIC ASSISTED LAPAROSCOPIC INCISIONAL HERNIA REPAIR WITH MESH. Nausea/Vomiting: None    Postoperative hydration reviewed and adequate. Pain:  Pain Scale 1: FLACC (07/22/22 1019)  Pain Intensity 1: 0 (07/22/22 1019)   Managed    Neurological Status:   Neuro (WDL): Within Defined Limits (07/22/22 1019)   At baseline    Mental Status, Level of Consciousness: Alert and  oriented to person, place, and time    Pulmonary Status:   O2 Device: Nasal cannula (07/22/22 1019)   Adequate oxygenation and airway patent    Complications related to anesthesia: None    Post-anesthesia assessment completed. No concerns    Signed By: Ayo Manjarrez MD     July 22, 2022              Procedure(s):  ROBOTIC ASSISTED LAPAROSCOPIC INCISIONAL HERNIA REPAIR WITH MESH. general    <BSHSIANPOST>    INITIAL Post-op Vital signs:   Vitals Value Taken Time   /71 07/22/22 1035   Temp 36 °C (96.8 °F) 07/22/22 1019   Pulse 78 07/22/22 1040   Resp 16 07/22/22 1040   SpO2 95 % 07/22/22 1040   Vitals shown include unvalidated device data.

## 2022-08-02 ENCOUNTER — HOSPITAL ENCOUNTER (OUTPATIENT)
Dept: CT IMAGING | Age: 77
Discharge: HOME | End: 2022-08-02
Attending: SURGERY
Payer: MEDICARE

## 2022-08-02 DIAGNOSIS — R22.2 CHEST MASS: ICD-10-CM

## 2022-08-02 PROCEDURE — 74011000636 HC RX REV CODE- 636: Performed by: RADIOLOGY

## 2022-08-02 PROCEDURE — 71260 CT THORAX DX C+: CPT

## 2022-08-02 RX ADMIN — IOPAMIDOL 100 ML: 612 INJECTION, SOLUTION INTRAVENOUS at 16:12

## 2022-08-05 ENCOUNTER — OFFICE VISIT (OUTPATIENT)
Dept: SURGERY | Age: 77
End: 2022-08-05
Payer: MEDICARE

## 2022-08-05 VITALS
RESPIRATION RATE: 16 BRPM | WEIGHT: 172.4 LBS | HEIGHT: 72 IN | TEMPERATURE: 97.8 F | BODY MASS INDEX: 23.35 KG/M2 | SYSTOLIC BLOOD PRESSURE: 136 MMHG | HEART RATE: 66 BPM | OXYGEN SATURATION: 96 % | DIASTOLIC BLOOD PRESSURE: 80 MMHG

## 2022-08-05 DIAGNOSIS — R91.1 LUNG NODULE SEEN ON IMAGING STUDY: ICD-10-CM

## 2022-08-05 DIAGNOSIS — Z09 POSTOPERATIVE EXAMINATION: Primary | ICD-10-CM

## 2022-08-05 PROCEDURE — 99024 POSTOP FOLLOW-UP VISIT: CPT

## 2022-08-05 NOTE — PATIENT INSTRUCTIONS
-  No lifting greater than 20 lbs x 4 weeks then may advance activity as tolerated. -  Ok to bath as normal and you may get incisions wet. Glue will fall off on its own. May moisturize      incision sites.     -  May use supportive wear    -  May use heating pad for any additional Abdominal soreness    -  Please call with any questions or concerns     -  Please make appointment with pulmonologist to discuss recent CT of chest:      Dr. Valentina Thomas office # [phone]    -  Follow up only as needed

## 2022-08-05 NOTE — PROGRESS NOTES
1. Have you been to the ER, urgent care clinic since your last visit? Hospitalized since your last visit? No    2. Have you seen or consulted any other health care providers outside of the [address] [place] [address] since your last visit? Include any pap smears or colon screening.  No

## 2022-08-05 NOTE — PROGRESS NOTES
Chief Complaint   Patient presents with    Post OP Follow Up     14 days s/p Robotic-assisted laparoscopic preperitoneal hernia repair with mesh         Subjective:     Benja Moseley is a 78 y.o. male presents for postop care 2 weeks s/p Robotic-assisted laparoscopic preperitoneal hernia repair with mesh. Patient is doing well postoperatively. Appetite is good. No nausea/vomiting. Tolerating a regular diet. Bowel movements are regular. The patient is voiding without difficulty. Patient having some mild incisional soreness to right side abd lap sites. Pain is well managed with Tylenol and Motrin. He reports he recently had CT of his chest done to follow up with a chest mass that was found on preop chest xray. He has a history of spinal tumor and prostate ca and is concerned about finding on CT imaging. Patient denies fever, chest pain, shortness of breath/difficulty breathing. Review of Systems:   A comprehensive review of systems was negative except for that written above      Mr. Abel Wray has a reminder for a \"due or due soon\" health maintenance. I have asked that he contact his primary care provider for follow-up on this health maintenance. Objective:     Visit Vitals  /80 (BP 1 Location: Left upper arm, BP Patient Position: Sitting, BP Cuff Size: Adult)   Pulse 66   Temp 97.8 °F (36.6 °C) (Oral)   Resp 16   Ht 6' (1.829 m)   Wt 172 lb 6.4 oz (78.2 kg)   SpO2 96%   BMI 23.38 kg/m²       General: alert, cooperative, no distress, appears stated age  CV: Regular rate and rhythm  Pulmonary: Lungs clear to auscultation   Abdomen:soft, bowel sounds active, appropriate mild incisional tenderness   Lap sites:  minimal swelling, healing well, no drainage, no erythema, no induration, no hernia, no seroma, no dehiscence, incision well approximated    Assessment:       ICD-10-CM ICD-9-CM    1. Postoperative examination  Z09 V67.00       2.  Lung nodule seen on imaging study  R91.1 793.11 REFERRAL TO PULMONARY DISEASE          Plan:     2+ weeks s/p Robotic-assisted laparoscopic preperitoneal hernia repair with mesh. Reviewed recent CT imaging with patient and referral made to pulmonary for evaluation. CT chest 8/2/22:  1. Groundglass nodular area measuring 11 mm anteriorly in left upper lobe. Follow-up exam in 6-12 months is suggested. This could be performed without  contrast material.  2. Mild dilatation of ascending thoracic aorta as described above. Wound care discussed. May get incisions wet and moisturize as needed. No lifting greater than 20 lbs x 4 weeks then may advance activity as tolerated. Recommended use of heating pad and supportive wear for any additional abdominal soreness. May also continue to alternate Tylenol and Motrin as needed for pain. Marcelo Rendon Sr verbalized understanding and questions were answered to the best of my knowledge and ability. Instructed patient to call with any questions or concerns. Discharged from surgical care with prn follow up.          > 17 minutes were spent with patient with greater than 50% of that time spent face to face counseling    Lizy Choi NP  [facility]   8/5/2022

## 2022-09-12 ENCOUNTER — PATIENT MESSAGE (OUTPATIENT)
Dept: INTERNAL MEDICINE CLINIC | Age: 77
End: 2022-09-12

## 2022-09-12 DIAGNOSIS — G47.00 INSOMNIA, UNSPECIFIED TYPE: ICD-10-CM

## 2022-09-12 RX ORDER — SERTRALINE HYDROCHLORIDE 50 MG/1
50 TABLET, FILM COATED ORAL DAILY
Qty: 90 TABLET | Refills: 3 | Status: SHIPPED | OUTPATIENT
Start: 2022-09-12

## 2022-09-15 NOTE — TELEPHONE ENCOUNTER
----- Message from Eustaquio Phoenix. Caroline Villa sent at 9/12/2022 12:37 PM EDT -----  Regarding: Refill  Could I get a refill for Julianaian?

## 2022-09-16 RX ORDER — ZOLPIDEM TARTRATE 5 MG/1
TABLET ORAL
Qty: 30 TABLET | Refills: 0 | Status: SHIPPED | OUTPATIENT
Start: 2022-09-16

## 2022-09-28 ENCOUNTER — OFFICE VISIT (OUTPATIENT)
Dept: INTERNAL MEDICINE CLINIC | Age: 77
End: 2022-09-28
Payer: MEDICARE

## 2022-09-28 VITALS
SYSTOLIC BLOOD PRESSURE: 110 MMHG | RESPIRATION RATE: 18 BRPM | TEMPERATURE: 98 F | BODY MASS INDEX: 23.84 KG/M2 | OXYGEN SATURATION: 98 % | HEIGHT: 72 IN | WEIGHT: 176 LBS | DIASTOLIC BLOOD PRESSURE: 70 MMHG | HEART RATE: 61 BPM

## 2022-09-28 DIAGNOSIS — L82.0 SEBORRHEIC KERATOSES, INFLAMED: Primary | ICD-10-CM

## 2022-09-28 PROBLEM — I70.0 AORTIC CALCIFICATION (HCC): Status: RESOLVED | Noted: 2018-04-18 | Resolved: 2022-09-28

## 2022-09-28 PROCEDURE — G9717 DOC PT DX DEP/BP F/U NT REQ: HCPCS | Performed by: INTERNAL MEDICINE

## 2022-09-28 PROCEDURE — G8420 CALC BMI NORM PARAMETERS: HCPCS | Performed by: INTERNAL MEDICINE

## 2022-09-28 PROCEDURE — 1101F PT FALLS ASSESS-DOCD LE1/YR: CPT | Performed by: INTERNAL MEDICINE

## 2022-09-28 PROCEDURE — 99213 OFFICE O/P EST LOW 20 MIN: CPT | Performed by: INTERNAL MEDICINE

## 2022-09-28 PROCEDURE — G8536 NO DOC ELDER MAL SCRN: HCPCS | Performed by: INTERNAL MEDICINE

## 2022-09-28 PROCEDURE — G8428 CUR MEDS NOT DOCUMENT: HCPCS | Performed by: INTERNAL MEDICINE

## 2022-09-28 PROCEDURE — 3017F COLORECTAL CA SCREEN DOC REV: CPT | Performed by: INTERNAL MEDICINE

## 2022-09-28 PROCEDURE — G0463 HOSPITAL OUTPT CLINIC VISIT: HCPCS | Performed by: INTERNAL MEDICINE

## 2022-09-28 RX ORDER — HYDROCORTISONE 25 MG/G
CREAM TOPICAL 2 TIMES DAILY
Qty: 30 G | Refills: 0 | Status: SHIPPED | OUTPATIENT
Start: 2022-09-28

## 2022-09-28 NOTE — PROGRESS NOTES
Chief Complaint   Patient presents with    Skin Problem     Groin x1.5 week ago and has not gone away. Raised skin lesion no bleeding or trauma  Vitals:    09/28/22 1402   BP: 110/70   Pulse: 61   Resp: 18   Temp: 98 °F (36.7 °C)   TempSrc: Temporal   SpO2: 98%   Weight: 176 lb (79.8 kg)   Height: 6' (1.829 m)     Scrotal S K noted  swollen some  Diagnoses and all orders for this visit:    1. Seborrheic keratoses, inflamed  -     hydrocortisone (HYTONE) 2.5 % topical cream; Apply  to affected area two (2) times a day.  use thin layer    Can see derm PRN

## 2022-09-28 NOTE — PROGRESS NOTES
Room:8  Identified pt with two pt identifiers(name and ). Reviewed record in preparation for visit and have obtained necessary documentation. Chief Complaint   Patient presents with    Skin Problem     Groin x1.5 week ago and has not gone away. Vitals:    22 1402   BP: 110/70   Pulse: 61   Resp: 18   Temp: 98 °F (36.7 °C)   TempSrc: Temporal   SpO2: 98%   Weight: 176 lb (79.8 kg)   Height: 6' (1.829 m)   PainSc:   0 - No pain       Health Maintenance Due   Topic    Shingrix Vaccine Age 50> (1 of 2)    AAA Screening 73-69 YO Male Smoking Patients     DTaP/Tdap/Td series (2 - Td or Tdap)    COVID-19 Vaccine (4 - Booster for Moderna series)    Flu Vaccine (1)       1. \"Have you been to the ER, urgent care clinic since your last visit? Hospitalized since your last visit? \" No    2. \"Have you seen or consulted any other health care providers outside of the [address] [place] [address] since your last visit? \" No     3. For patients over 45: Has the patient had a colonoscopy? Yes - no Care Gap present     If the patient is female:    4. For patients over 36: Has the patient had a mammogram? NA - based on age    11. For patients over 21: Has the patient had a pap smear?  NA - based on age    Current Outpatient Medications   Medication Instructions    atorvastatin (LIPITOR) 10 mg, Oral, DAILY    co-enzyme Q-10 (CO Q-10) 200 mg, DAILY    cyanocobalamin 2,500 mcg, DAILY    sertraline (ZOLOFT) 50 mg, Oral, DAILY    Vit C-Vit E-Copper-ZnOx-Lutein 226 mg-200 unit -0.8 mg-5 mg cap 1 Tablet, 2 TIMES DAILY    zolpidem (AMBIEN) 5 mg tablet TAKE ONE TABLET BY MOUTH ONCE AT BEDTIME IF NEEDED FOR SLEEP    ZyrTEC 10 mg, Oral, DAILY AS NEEDED       Allergies   Allergen Reactions    Doxycycline Nausea and Vomiting     N&V, DIARRHEA    Iodinated Contrast Media Nausea and Vomiting    Iodine Nausea and Vomiting    Morphine Hives    Pcn [Penicillins] Hives     No reported severe non-IgE mediated response    Tamiflu [Oseltamivir] Rash       Immunization History   Administered Date(s) Administered    (RETIRED) Pneumococcal Vaccine (Unspecified Type) 01/02/2003, 11/01/2012    COVID-19, MODERNA BLUE border, Primary or Immunocompromised, (age 18y+), IM, 100 mcg/0.5mL 03/10/2021, 04/07/2021, 12/08/2021    Influenza High Dose Vaccine PF 10/27/2015, 11/03/2016, 11/09/2017, 09/12/2018, 09/23/2019    Influenza Vaccine 11/13/2013, 10/13/2021    Influenza Vaccine (Tri) Adjuvanted (>65 Yrs FLUAD TRI [id number]) 10/13/2020    Influenza Vaccine Split 11/01/2012    Influenza, FLUAD, (age 72 y+), Adjuvanted 10/13/2020    Influenza, FLUARIX, FLULAVAL, FLUZONE (age 10 mo+) AND AFLURIA, (age 1 y+), PF, 0.5mL 11/17/2014    Pneumococcal Conjugate (PCV-13) 02/11/2017    Pneumococcal Polysaccharide (PPSV-23) 11/12/2013, 02/12/2015    TDAP Vaccine 09/14/2010    Zoster 01/02/2009       Past Medical History:   Diagnosis Date    Anxiety associated with depression     Arthritis     Depression     GERD (gastroesophageal reflux disease)     Glaucoma     w/uveitis & detached retina    Hypercholesterolemia     Incidental lung nodule, > 3mm and < 8mm 6/14/2011    Optic neuritis, left     Prostate cancer [facility])     prostatectomy 9/2011    Schwannoma of spinal cord ([facility] 11/1/2012

## 2022-10-03 ENCOUNTER — TELEPHONE (OUTPATIENT)
Dept: INTERNAL MEDICINE CLINIC | Age: 77
End: 2022-10-03

## 2022-10-03 NOTE — TELEPHONE ENCOUNTER
Prior Authorization Approved. Left message letting patient know all he has to do now is call pharmacy to fill it. Crys Villafuerte Da Silva: [id number] - PA Case ID: [id number] - Rx #: [id number] help? Call us at [phone]  Outcome  Approved on October 1    Request Reference Number: [id number]. ZOLPIDEM TAB 5MG is approved through 09/30/2023. Your patient may now fill this prescription and it will be covered.     Drug  Zolpidem Tartrate 5MG tablets  Form  [employer] Electronic Prior Authorization Form (2017 NCPDP)  Original Claim Info  68 Max 10 Quantity in 30 Days

## 2022-11-22 NOTE — ACP (ADVANCE CARE PLANNING)
Advance Care Planning (ACP) Provider Conversation Snapshot    Date of ACP Conversation: 12/04/18  Persons included in Conversation:  patient  Length of ACP Conversation in minutes:  <16 minutes (Non-Billable)    Authorized Decision Maker (if patient is incapable of making informed decisions): This person is:              For Patients with Decision Making Capacity:       Conversation Outcomes / Follow-Up Plan:   Recommended completion of Advance Directive form after review of ACP materials and conversation with prospective healthcare agent DISPLAY PLAN FREE TEXT DISPLAY PLAN FREE TEXT DISPLAY PLAN FREE TEXT DISPLAY PLAN FREE TEXT DISPLAY PLAN FREE TEXT DISPLAY PLAN FREE TEXT DISPLAY PLAN FREE TEXT

## 2022-12-05 DIAGNOSIS — G47.00 INSOMNIA, UNSPECIFIED TYPE: ICD-10-CM

## 2022-12-05 RX ORDER — ZOLPIDEM TARTRATE 5 MG/1
TABLET ORAL
Qty: 30 TABLET | Refills: 0 | Status: SHIPPED | OUTPATIENT
Start: 2022-12-05

## 2022-12-28 ENCOUNTER — OFFICE VISIT (OUTPATIENT)
Dept: INTERNAL MEDICINE CLINIC | Age: 77
End: 2022-12-28
Payer: MEDICARE

## 2022-12-28 VITALS
SYSTOLIC BLOOD PRESSURE: 121 MMHG | OXYGEN SATURATION: 97 % | HEIGHT: 74 IN | WEIGHT: 173.8 LBS | BODY MASS INDEX: 22.3 KG/M2 | RESPIRATION RATE: 19 BRPM | TEMPERATURE: 98.3 F | DIASTOLIC BLOOD PRESSURE: 65 MMHG | HEART RATE: 69 BPM

## 2022-12-28 DIAGNOSIS — G47.00 INSOMNIA, UNSPECIFIED TYPE: ICD-10-CM

## 2022-12-28 DIAGNOSIS — E78.5 DYSLIPIDEMIA: ICD-10-CM

## 2022-12-28 DIAGNOSIS — I70.0 AORTIC CALCIFICATION (HCC): ICD-10-CM

## 2022-12-28 DIAGNOSIS — F41.8 ANXIETY ASSOCIATED WITH DEPRESSION: ICD-10-CM

## 2022-12-28 DIAGNOSIS — Z00.00 MEDICARE ANNUAL WELLNESS VISIT, SUBSEQUENT: Primary | ICD-10-CM

## 2022-12-28 DIAGNOSIS — Z13.39 SCREENING FOR ALCOHOLISM: ICD-10-CM

## 2022-12-28 PROCEDURE — 3017F COLORECTAL CA SCREEN DOC REV: CPT | Performed by: INTERNAL MEDICINE

## 2022-12-28 PROCEDURE — 1101F PT FALLS ASSESS-DOCD LE1/YR: CPT | Performed by: INTERNAL MEDICINE

## 2022-12-28 PROCEDURE — G8427 DOCREV CUR MEDS BY ELIG CLIN: HCPCS | Performed by: INTERNAL MEDICINE

## 2022-12-28 PROCEDURE — G8536 NO DOC ELDER MAL SCRN: HCPCS | Performed by: INTERNAL MEDICINE

## 2022-12-28 PROCEDURE — G0442 ANNUAL ALCOHOL SCREEN 15 MIN: HCPCS | Performed by: INTERNAL MEDICINE

## 2022-12-28 PROCEDURE — 99214 OFFICE O/P EST MOD 30 MIN: CPT | Performed by: INTERNAL MEDICINE

## 2022-12-28 PROCEDURE — G8420 CALC BMI NORM PARAMETERS: HCPCS | Performed by: INTERNAL MEDICINE

## 2022-12-28 PROCEDURE — G0439 PPPS, SUBSEQ VISIT: HCPCS | Performed by: INTERNAL MEDICINE

## 2022-12-28 PROCEDURE — G9717 DOC PT DX DEP/BP F/U NT REQ: HCPCS | Performed by: INTERNAL MEDICINE

## 2022-12-28 PROCEDURE — G0463 HOSPITAL OUTPT CLINIC VISIT: HCPCS | Performed by: INTERNAL MEDICINE

## 2022-12-28 RX ORDER — TACROLIMUS 0.3 MG/G
OINTMENT TOPICAL
COMMUNITY
Start: 2022-12-06

## 2022-12-28 RX ORDER — PREDNISOLONE ACETATE 10 MG/ML
SUSPENSION/ DROPS OPHTHALMIC
COMMUNITY
Start: 2022-12-06

## 2022-12-28 RX ORDER — TIMOLOL MALEATE 5 MG/ML
SOLUTION/ DROPS OPHTHALMIC
COMMUNITY
Start: 2022-12-23

## 2022-12-28 NOTE — PATIENT INSTRUCTIONS
Medicare Wellness Visit, Male    The best way to live healthy is to have a lifestyle where you eat a well-balanced diet, exercise regularly, limit alcohol use, and quit all forms of tobacco/nicotine, if applicable. Regular preventive services are another way to keep healthy. Preventive services (vaccines, screening tests, monitoring & exams) can help personalize your care plan, which helps you manage your own care. Screening tests can find health problems at the earliest stages, when they are easiest to treat. [facility] follows the current, evidence-based guidelines published by the [facility] ([facility]) when recommending preventive services for our patients. Because we follow these guidelines, sometimes recommendations change over time as research supports it. (For example, a prostate screening blood test is no longer routinely recommended for men with no symptoms). Of course, you and your doctor may decide to screen more often for some diseases, based on your risk and co-morbidities (chronic disease you are already diagnosed with). Preventive services for you include:  - Medicare offers their members a free annual wellness visit, which is time for you and your primary care provider to discuss and plan for your preventive service needs.  Take advantage of this benefit every year!    -Over the age of 72 should receive the recommended pneumonia vaccines.   -All adults should have a flu vaccine yearly.  -All adults should receive a tetanus vaccine every 10 years.  -Over the age of 48 should receive the shingles vaccines.    -All adults should be screened once for Hepatitis C.  -All adults age 38-68 who are overweight should have a diabetes screening test once every three years.   -Other screening tests & preventive services for persons with diabetes include: an eye exam to screen for diabetic retinopathy, a kidney function test, a foot exam, and stricter control over your cholesterol.   -Cardiovascular screening for adults with routine risk involves an electrocardiogram (ECG) at intervals determined by the provider.     -Colorectal cancer screening should be done for adults age 43-69 with no increased risk factors for colorectal cancer. There are a number of acceptable methods of screening for this type of cancer. Each test has its own benefits and drawbacks. Discuss with your provider what is most appropriate for you during your annual wellness visit. The different tests include: colonoscopy (considered the best screening method), a fecal occult blood test, a fecal DNA test, and sigmoidoscopy.    -Lung cancer screening is recommended annually with a low dose CT scan for adults between age 54 and 68, who have smoked at least 30 pack years (equivalent of 1 pack per day for 30 days), and who is a current smoker or quit less than 15 years ago. -An Abdominal Aortic Aneurysm (AAA) Screening is recommended for men age 73-68 who has ever smoked in their lifetime.      Here is a list of your current Health Maintenance items (your personalized list of preventive services) with a due date:  Health Maintenance Due   Topic Date Due    Shingles Vaccine (1 of 2) Never done    AAA Screening  Never done    DTaP/Tdap/Td  (2 - Td or Tdap) 09/14/2020    COVID-19 Vaccine (4 - Booster for Moderna series) 02/02/2022    Cholesterol Test   12/22/2022

## 2022-12-28 NOTE — PROGRESS NOTES
Chief Complaint   Patient presents with    Annual Wellness Visit     Pt states has had congestion, runny nose, cough and sore throat says this has been on and off for the past month          Vitals:    12/28/22 1007   BP: 121/65   Pulse: 69   Resp: 19   Temp: 98.3 °F (36.8 °C)   TempSrc: Temporal   SpO2: 97%   Weight: 173 lb 12.8 oz (78.8 kg)   Height: 6' 2\" (1.88 m)   PainSc:   0 - No pain       Current Outpatient Medications on File Prior to Visit   Medication Sig Dispense Refill    prednisoLONE acetate (PRED FORTE) 1 % ophthalmic suspension       tacrolimus (PROTOPIC) 0.03 % ointment       timolol (TIMOPTIC) 0.5 % ophthalmic solution       zolpidem (AMBIEN) 5 mg tablet TAKE ONE TABLET BY MOUTH ONCE AT BEDTIME IF NEEDED FOR SLEEP 30 Tablet 0    hydrocortisone (HYTONE) 2.5 % topical cream Apply  to affected area two (2) times a day. use thin layer 30 g 0    sertraline (ZOLOFT) 50 mg tablet TAKE 1 TABLET BY MOUTH  DAILY 90 Tablet 3    Cetirizine (ZyrTEC) 10 mg cap Take 10 mg by mouth daily as needed. cyanocobalamin 1,000 mcg tablet Take 2,500 mcg by mouth daily. atorvastatin (LIPITOR) 10 mg tablet Take 1 Tablet by mouth daily. (Patient taking differently: Take 10 mg by mouth daily (with dinner). ) 90 Tablet 3    co-enzyme Q-10 (CO Q-10) 100 mg capsule Take 200 mg by mouth daily. Vit C-Vit E-Copper-ZnOx-Lutein 226 mg-200 unit -0.8 mg-5 mg cap Take 1 Tablet by mouth two (2) times a day. No current facility-administered medications on file prior to visit. Health Maintenance Due   Topic    Shingles Vaccine (1 of 2)    AAA Screening 73-69 YO Male Smoking Patients     DTaP/Tdap/Td series (2 - Td or Tdap)    COVID-19 Vaccine (4 - Booster for Moderna series)    Medicare Yearly Exam     Lipid Screen        1. \"Have you been to the ER, urgent care clinic since your last visit? Hospitalized since your last visit? \" No    2.  \"Have you seen or consulted any other health care providers outside of the [facility] since your last visit? \" No     3. For patients aged 39-70: Has the patient had a colonoscopy / FIT/ Cologuard? Yes - no Care Gap present      If the patient is female:    4. For patients aged 41-77: Has the patient had a mammogram within the past 2 years? NA - based on age or sex      11. For patients aged 21-65: Has the patient had a pap smear?  NA - based on age or sex

## 2022-12-28 NOTE — PROGRESS NOTES
This is the Subsequent Medicare Annual Wellness Exam, performed 12 months or more after the Initial AWV or the last Subsequent AWV    I have reviewed the patient's medical history in detail and updated the computerized patient record. Assessment/Plan   Education and counseling provided:  Are appropriate based on today's review and evaluation  Pneumococcal Vaccine  Influenza Vaccine    1. Medicare annual wellness visit, subsequent  2. Screening for alcoholism  -     [place] ANNUAL ALCOHOL SCREEN 15 MIN       Depression Risk Factor Screening     3 most recent PHQ Screens 8/5/2022   PHQ Not Done -   Little interest or pleasure in doing things Not at all   Feeling down, depressed, irritable, or hopeless Not at all   Total Score PHQ 2 0       Alcohol & Drug Abuse Risk Screen    Do you average more than 1 drink per night or more than 7 drinks a week: No    In the past three months have you have had more than 4 drinks containing alcohol on one occasion: No          Functional Ability and Level of Safety    Hearing: Hearing is good. Activities of Daily Living: The home contains: no safety equipment. Patient does total self care      Ambulation: with no difficulty     Fall Risk:  Fall Risk Assessment, last 12 mths 8/5/2022   Able to walk? Yes   Fall in past 12 months? 0   Do you feel unsteady?  0   Are you worried about falling 0      Abuse Screen:  Patient is not abused       Cognitive Screening    Has your family/caregiver stated any concerns about your memory: no     Cognitive Screening: Normal - Verbal Fluency Test    Health Maintenance Due     Health Maintenance Due   Topic Date Due    Shingles Vaccine (1 of 2) Never done    AAA Screening 73-67 YO Male Smoking Patients  Never done    DTaP/Tdap/Td series (2 - Td or Tdap) 09/14/2020    COVID-19 Vaccine (4 - Booster for Moderna series) 02/02/2022    Lipid Screen  12/22/2022       Patient Care Team   Patient Care Team:  Eber Lopez MD as PCP - Sujata Loving MD as PCP - [facility] Empaneled Provider    History     Patient Active Problem List   Diagnosis Code    Anxiety F41.9    Pulmonary nodule R91.1    Incidental lung nodule, > 3mm and < 8mm R91.1    Anxiety associated with depression F41.8    Prostate cancer ([facility] [address] C61    Schwannoma of spinal cord (HCC) D33.4    Dyslipidemia, goal LDL below 100 E78.5    Prehypertension R03.0    Mild depression F32. A    Adenomatous polyp of ascending colon D12.2    Ventral hernia without obstruction or gangrene K43.9     Past Medical History:   Diagnosis Date    Anxiety associated with depression     Arthritis     Autoimmune disease ([facility] [address] 1974    Uvitus    Contact dermatitis and eczema due to cause 2014/2015    Possible Lyme/Poison Ivy    Depression     GERD (gastroesophageal reflux disease)     Glaucoma     w/uveitis & detached retina    Hypercholesterolemia     Incidental lung nodule, > 3mm and < 8mm 06/14/2011    Optic neuritis, left     Prostate cancer [facility])     prostatectomy 9/2011    Schwannoma of spinal cord ([facility] [address] 11/01/2012    Trauma 1980    Head Wound      Past Surgical History:   Procedure Laterality Date    ENDOSCOPY, COLON, DIAGNOSTIC  2009    HX CATARACT REMOVAL      BILATERAL    HX COLONOSCOPY  2019    HX ENDOSCOPY  2018    HX GI  2003    splenectomy    HX HEENT  2009    DETACHED RETINA 2X LEFT EYE    HX HEENT  2009    CORNEA TRANSPLANT LEFT EYE    HX HERNIA REPAIR  1970, 1994    BILATERAL    HX HERNIA REPAIR  09/28/2011    Diag.  Lap. and repair of incisional hernia with mesh    HX HERNIA REPAIR  07/22/2022    Robotic-assisted laparoscopic preperitoneal hernia repair with mesh by Dr. You Haywood  2011    robotic prostatectomy    ID ABDOMEN SURGERY PROC UNLISTED  2003    SPLENECTOMY    ID LAP,PROSTATECTOMY,RADICAL,W/NERVE SPARE,INCL ROBOTIC  09/2011    ID REPAIR INCISIONAL HERNIA,REDUCIBLE  09/25/2011    diagnostic lap. wih mesh     Current Outpatient Medications Medication Sig Dispense Refill    prednisoLONE acetate (PRED FORTE) 1 % ophthalmic suspension       tacrolimus (PROTOPIC) 0.03 % ointment       timolol (TIMOPTIC) 0.5 % ophthalmic solution       zolpidem (AMBIEN) 5 mg tablet TAKE ONE TABLET BY MOUTH ONCE AT BEDTIME IF NEEDED FOR SLEEP 30 Tablet 0    hydrocortisone (HYTONE) 2.5 % topical cream Apply  to affected area two (2) times a day. use thin layer 30 g 0    sertraline (ZOLOFT) 50 mg tablet TAKE 1 TABLET BY MOUTH  DAILY 90 Tablet 3    Cetirizine (ZyrTEC) 10 mg cap Take 10 mg by mouth daily as needed. cyanocobalamin 1,000 mcg tablet Take 2,500 mcg by mouth daily. atorvastatin (LIPITOR) 10 mg tablet Take 1 Tablet by mouth daily. (Patient taking differently: Take 10 mg by mouth daily (with dinner). ) 90 Tablet 3    co-enzyme Q-10 (CO Q-10) 100 mg capsule Take 200 mg by mouth daily. Vit C-Vit E-Copper-ZnOx-Lutein 226 mg-200 unit -0.8 mg-5 mg cap Take 1 Tablet by mouth two (2) times a day.        Allergies   Allergen Reactions    Doxycycline Nausea and Vomiting     N&V, DIARRHEA    Iodinated Contrast Media Nausea and Vomiting    Iodine Nausea and Vomiting    Morphine Hives    Pcn [Penicillins] Hives     No reported severe non-IgE mediated response    Tamiflu [Oseltamivir] Rash       Family History   Problem Relation Age of Onset    Liver Disease Mother     Alcohol abuse Mother     Gall Bladder Disease Mother         Surgicaly removed    Headache Mother         Migraine    Migraines Mother     Heart Disease Father 68    Elevated Lipids Father     Alcohol abuse Father     Cancer Sister         cervical    Cancer Brother         PROSTATE    Cancer Sister         BRAIN    Diabetes Maternal Grandmother     Cancer Sister         Lung    Cancer Sister         Lung    Cancer Brother         Prostate    Anesth Problems Neg Hx      Social History     Tobacco Use    Smoking status: Former     Packs/day: 1.00     Years: 5.00     Pack years: 5.00     Types: Cigarettes     Quit date: 1975     Years since quittin.0     Passive exposure: Past    Smokeless tobacco: Never   Substance Use Topics    Alcohol use: Not Currently     Comment: RARE     Doing ok  On statin walks a fair amount  Vision left very low  Anxiety mostly controlled with ssri  Uses rare ambien for sleep  No longer goes to [place] in winter sold the place      Review of Systems - General ROS: positive for  - fatigue  Psychological ROS: negative for - depression  Hematological and Lymphatic ROS: negative  Endocrine ROS: negative for - hair pattern changes, hot flashes or palpitations  Respiratory ROS: no cough, shortness of breath, or wheezing  Cardiovascular ROS: no chest pain or dyspnea on exertion  Gastrointestinal ROS: no abdominal pain, change in bowel habits, or black or bloody stools  Genito-Urinary ROS: no dysuria, trouble voiding, or hematuria  Musculoskeletal ROS: positive for - gait disturbance, joint pain, joint stiffness and joint swelling  Neurological ROS: no TIA or stroke symptoms  Dermatological ROS: negative for - mole changes, nail changes or skin lesion changes  Vitals:    22 1007   BP: 121/65   Pulse: 69   Resp: 19   Temp: 98.3 °F (36.8 °C)   TempSrc: Temporal   SpO2: 97%   Weight: 173 lb 12.8 oz (78.8 kg)   Height: 6' 2\" (1.88 m)     Wt Readings from Last 3 Encounters:   22 173 lb 12.8 oz (78.8 kg)   22 176 lb (79.8 kg)   22 172 lb 6.4 oz (78.2 kg)     S1 and S2 normal, no murmurs, clicks, gallops or rubs. Regular rate and rhythm. Chest is clear; no wheezes or rales. No edema or JVD. Alert oriented  Norml mentation          1. Medicare annual wellness visit, subsequent  Up to date    2. Screening for alcoholism  Min imal  - [place] ANNUAL ALCOHOL SCREEN 15 MIN    3. Aortic calcification (HCC)  Seen on ct    4. Dyslipidemia  On statin  - CBC WITH AUTOMATED DIFF; Future  - LIPID PANEL; Future  - METABOLIC PANEL, COMPREHENSIVE; Future    5.  Insomnia, unspecified type  Occ ambien    6.  Anxiety associated with depression  Sertraline helps  Requested Prescriptions      No prescriptions requested or ordered in this encounter       Lyndon Alvarado MD

## 2022-12-29 DIAGNOSIS — E78.5 DYSLIPIDEMIA: ICD-10-CM

## 2022-12-29 LAB
ALBUMIN SERPL-MCNC: 3.5 G/DL (ref 3.5–5)
ALBUMIN/GLOB SERPL: 1.1 {RATIO} (ref 1.1–2.2)
ALP SERPL-CCNC: 131 U/L (ref 45–117)
ALT SERPL-CCNC: 26 U/L (ref 12–78)
ANION GAP SERPL CALC-SCNC: 4 MMOL/L (ref 5–15)
AST SERPL-CCNC: 20 U/L (ref 15–37)
BASOPHILS # BLD: 0.1 K/UL (ref 0–0.1)
BASOPHILS NFR BLD: 2 % (ref 0–1)
BILIRUB SERPL-MCNC: 0.3 MG/DL (ref 0.2–1)
BUN SERPL-MCNC: 18 MG/DL (ref 6–20)
BUN/CREAT SERPL: 19 (ref 12–20)
CALCIUM SERPL-MCNC: 9.5 MG/DL (ref 8.5–10.1)
CHLORIDE SERPL-SCNC: 103 MMOL/L (ref 97–108)
CHOLEST SERPL-MCNC: 158 MG/DL
CO2 SERPL-SCNC: 31 MMOL/L (ref 21–32)
CREAT SERPL-MCNC: 0.93 MG/DL (ref 0.7–1.3)
DIFFERENTIAL METHOD BLD: ABNORMAL
EOSINOPHIL # BLD: 0.5 K/UL (ref 0–0.4)
EOSINOPHIL NFR BLD: 5 % (ref 0–7)
ERYTHROCYTE [DISTWIDTH] IN BLOOD BY AUTOMATED COUNT: 13.2 % (ref 11.5–14.5)
GLOBULIN SER CALC-MCNC: 3.3 G/DL (ref 2–4)
GLUCOSE SERPL-MCNC: 102 MG/DL (ref 65–100)
HCT VFR BLD AUTO: 45.2 % (ref 36.6–50.3)
HDLC SERPL-MCNC: 55 MG/DL
HDLC SERPL: 2.9 {RATIO} (ref 0–5)
HGB BLD-MCNC: 15 G/DL (ref 12.1–17)
IMM GRANULOCYTES # BLD AUTO: 0.1 K/UL (ref 0–0.04)
IMM GRANULOCYTES NFR BLD AUTO: 1 % (ref 0–0.5)
LDLC SERPL CALC-MCNC: 82.8 MG/DL (ref 0–100)
LYMPHOCYTES # BLD: 3 K/UL (ref 0.8–3.5)
LYMPHOCYTES NFR BLD: 31 % (ref 12–49)
MCH RBC QN AUTO: 32.8 PG (ref 26–34)
MCHC RBC AUTO-ENTMCNC: 33.2 G/DL (ref 30–36.5)
MCV RBC AUTO: 98.7 FL (ref 80–99)
MONOCYTES # BLD: 1.3 K/UL (ref 0–1)
MONOCYTES NFR BLD: 13 % (ref 5–13)
NEUTS SEG # BLD: 4.7 K/UL (ref 1.8–8)
NEUTS SEG NFR BLD: 48 % (ref 32–75)
NRBC # BLD: 0 K/UL (ref 0–0.01)
NRBC BLD-RTO: 0 PER 100 WBC
PLATELET # BLD AUTO: 337 K/UL (ref 150–400)
PMV BLD AUTO: 10.1 FL (ref 8.9–12.9)
POTASSIUM SERPL-SCNC: 4.6 MMOL/L (ref 3.5–5.1)
PROT SERPL-MCNC: 6.8 G/DL (ref 6.4–8.2)
RBC # BLD AUTO: 4.58 M/UL (ref 4.1–5.7)
SODIUM SERPL-SCNC: 138 MMOL/L (ref 136–145)
TRIGL SERPL-MCNC: 101 MG/DL (ref ?–150)
VLDLC SERPL CALC-MCNC: 20.2 MG/DL
WBC # BLD AUTO: 9.6 K/UL (ref 4.1–11.1)

## 2023-01-23 RX ORDER — ATORVASTATIN CALCIUM 10 MG/1
10 TABLET, FILM COATED ORAL DAILY
Qty: 90 TABLET | Refills: 3 | Status: SHIPPED | OUTPATIENT
Start: 2023-01-23

## 2023-01-26 ENCOUNTER — TELEPHONE (OUTPATIENT)
Dept: SURGERY | Age: 78
End: 2023-01-26

## 2023-01-26 NOTE — TELEPHONE ENCOUNTER
Patient called stating that he is still having pain and discomfort from his hernia. Patient would like to know if he should come in for an appointment.

## 2023-01-26 NOTE — TELEPHONE ENCOUNTER
I called the patient back and he said he had a hernia repair done last year and he said he is still having pain from where he had his hernia repaired and would like for dr Brijesh Maier to examine him and see if anything is wrong. I transfered him to the  to schedule an appointment. Pt in agreement.

## 2023-02-06 ENCOUNTER — OFFICE VISIT (OUTPATIENT)
Dept: SURGERY | Age: 78
End: 2023-02-06
Payer: MEDICARE

## 2023-02-06 VITALS
DIASTOLIC BLOOD PRESSURE: 71 MMHG | OXYGEN SATURATION: 93 % | HEART RATE: 75 BPM | WEIGHT: 176 LBS | SYSTOLIC BLOOD PRESSURE: 115 MMHG | RESPIRATION RATE: 20 BRPM | TEMPERATURE: 99.5 F | BODY MASS INDEX: 22.59 KG/M2 | HEIGHT: 74 IN

## 2023-02-06 DIAGNOSIS — R10.33 ABDOMINAL PAIN, PERIUMBILICAL: ICD-10-CM

## 2023-02-06 PROCEDURE — G8536 NO DOC ELDER MAL SCRN: HCPCS | Performed by: SURGERY

## 2023-02-06 PROCEDURE — G9717 DOC PT DX DEP/BP F/U NT REQ: HCPCS | Performed by: SURGERY

## 2023-02-06 PROCEDURE — 1123F ACP DISCUSS/DSCN MKR DOCD: CPT | Performed by: SURGERY

## 2023-02-06 PROCEDURE — G8420 CALC BMI NORM PARAMETERS: HCPCS | Performed by: SURGERY

## 2023-02-06 PROCEDURE — G8427 DOCREV CUR MEDS BY ELIG CLIN: HCPCS | Performed by: SURGERY

## 2023-02-06 PROCEDURE — 3017F COLORECTAL CA SCREEN DOC REV: CPT | Performed by: SURGERY

## 2023-02-06 PROCEDURE — 1101F PT FALLS ASSESS-DOCD LE1/YR: CPT | Performed by: SURGERY

## 2023-02-06 PROCEDURE — 99213 OFFICE O/P EST LOW 20 MIN: CPT | Performed by: SURGERY

## 2023-02-06 NOTE — PROGRESS NOTES
Identified pt with two pt identifiers (name and ). Reviewed chart in preparation for visit and have obtained necessary documentation. Mik Hurt is a 78 y.o. male  Chief Complaint   Patient presents with    Follow-up     Preperitoneal hernia repair     Visit Vitals  /71 (BP 1 Location: Left upper arm, BP Patient Position: Sitting, BP Cuff Size: Large adult)   Pulse 75   Temp 99.5 °F (37.5 °C)   Resp 20   Ht 6' 2\" (1.88 m)   Wt 176 lb (79.8 kg)   SpO2 93%   BMI 22.60 kg/m²       1. Have you been to the ER, urgent care clinic since your last visit? Hospitalized since your last visit? No    2. Have you seen or consulted any other health care providers outside of the [address] [place] [address] since your last visit? Include any pap smears or colon screening.  No

## 2023-02-07 NOTE — PROGRESS NOTES
Subjective:      Stevie Moses is a 78 y.o. male presents for postop care nearly 7 months following robotic assisted laparoscopic incisional/umbilical hernia repair with mesh (preperitoneal mesh placement). .   Appetite is good. Eating a regular diet without difficulty. Bowel movements are regular. He notes recurrent bulge in the area of umbilicus, with pain to the right of umbilicus. No nausea, vomiting, constipation. He performs physical therapy for lower extremity degenerative joint disease. Objective:     Visit Vitals  /71 (BP 1 Location: Left upper arm, BP Patient Position: Sitting, BP Cuff Size: Large adult)   Pulse 75   Temp 99.5 °F (37.5 °C)   Resp 20   Ht 6' 2\" (1.88 m)   Wt 176 lb (79.8 kg)   SpO2 93%   BMI 22.60 kg/m²       General:  alert, cooperative, no distress, appears stated age   Abdomen: Nondistended, soft. Mild right sided abdominal pain without obvious fascial defect. Periumbilical bulge, tender, reducible   Incision: All well-healed. Assessment:     Periumbilical pain following incisional/umbilical hernia repairs July 2022; findings are concerning for possible recurrence. Plan:     1. Continue current medications. 2.  Continue physical therapy, but avoid lifting more than 20 pounds for the time being. 3.  CT abdomen/pelvis to assess for recurrent hernia. We will review results thereafter and discuss next steps.

## 2023-02-16 ENCOUNTER — TELEPHONE (OUTPATIENT)
Dept: SURGERY | Age: 78
End: 2023-02-16

## 2023-02-16 NOTE — TELEPHONE ENCOUNTER
Patient is calling in reference to his CT ABD PELV WO CONT.  Stated he realized it was scheduled w/o Contrast and wanted to know if Dr Choe Records wanted the CT with contrast.

## 2023-02-16 NOTE — TELEPHONE ENCOUNTER
Patient identified with two patient identifiers. Patient questioning if CT order should have been with contrast also? Patient states when it was scheduled they said he should arrive early to drink contrast.  Patient informed I will look into this and follow  up. Discussed with Leda Matamoros patient informed CT order will remain as schedule. No changes will be made. Patient expressed understanding.

## 2023-02-17 ENCOUNTER — HOSPITAL ENCOUNTER (OUTPATIENT)
Dept: CT IMAGING | Age: 78
Discharge: HOME | End: 2023-02-17
Attending: SURGERY
Payer: MEDICARE

## 2023-02-17 DIAGNOSIS — R10.33 ABDOMINAL PAIN, PERIUMBILICAL: ICD-10-CM

## 2023-02-17 PROCEDURE — 74176 CT ABD & PELVIS W/O CONTRAST: CPT

## 2023-02-28 ENCOUNTER — TELEPHONE (OUTPATIENT)
Dept: SURGERY | Age: 78
End: 2023-02-28

## 2023-02-28 NOTE — TELEPHONE ENCOUNTER
I called the patient back and I let him know that Dr Kota Lazo said it was safe for him to wait until April for his hernia repair. Pt in agreement.

## 2023-02-28 NOTE — TELEPHONE ENCOUNTER
Patient called and stated he was called and told his surgery date is April 14th and would like to know if it is safe to wait that long to have the surgery.

## 2023-04-13 ENCOUNTER — HOSPITAL ENCOUNTER (OUTPATIENT)
Dept: GENERAL RADIOLOGY | Age: 78
Discharge: HOME | End: 2023-04-13
Attending: SURGERY
Payer: MEDICARE

## 2023-04-13 ENCOUNTER — HOSPITAL ENCOUNTER (OUTPATIENT)
Dept: PREADMISSION TESTING | Age: 78
Discharge: HOME | End: 2023-04-13
Payer: MEDICARE

## 2023-04-13 VITALS
HEART RATE: 68 BPM | BODY MASS INDEX: 22.07 KG/M2 | HEIGHT: 74 IN | WEIGHT: 171.96 LBS | SYSTOLIC BLOOD PRESSURE: 118 MMHG | DIASTOLIC BLOOD PRESSURE: 73 MMHG | TEMPERATURE: 98.1 F | RESPIRATION RATE: 18 BRPM | OXYGEN SATURATION: 96 %

## 2023-04-13 LAB
ALBUMIN SERPL-MCNC: 3.5 G/DL (ref 3.5–5)
ALBUMIN/GLOB SERPL: 1.2 (ref 1.1–2.2)
ALP SERPL-CCNC: 102 U/L (ref 45–117)
ALT SERPL-CCNC: 25 U/L (ref 12–78)
ANION GAP SERPL CALC-SCNC: 2 MMOL/L (ref 5–15)
APPEARANCE UR: CLEAR
AST SERPL-CCNC: 22 U/L (ref 15–37)
ATRIAL RATE: 62 BPM
BACTERIA URNS QL MICRO: NEGATIVE /HPF
BASOPHILS # BLD: 0.1 K/UL (ref 0–0.1)
BASOPHILS NFR BLD: 2 % (ref 0–1)
BILIRUB SERPL-MCNC: 0.4 MG/DL (ref 0.2–1)
BILIRUB UR QL: NEGATIVE
BUN SERPL-MCNC: 18 MG/DL (ref 6–20)
BUN/CREAT SERPL: 19 (ref 12–20)
CALCIUM SERPL-MCNC: 9 MG/DL (ref 8.5–10.1)
CALCULATED P AXIS, ECG09: 61 DEGREES
CALCULATED R AXIS, ECG10: -27 DEGREES
CALCULATED T AXIS, ECG11: 22 DEGREES
CHLORIDE SERPL-SCNC: 101 MMOL/L (ref 97–108)
CO2 SERPL-SCNC: 30 MMOL/L (ref 21–32)
COLOR UR: ABNORMAL
CREAT SERPL-MCNC: 0.93 MG/DL (ref 0.7–1.3)
DIAGNOSIS, 93000: NORMAL
DIFFERENTIAL METHOD BLD: ABNORMAL
EOSINOPHIL # BLD: 0.4 K/UL (ref 0–0.4)
EOSINOPHIL NFR BLD: 4 % (ref 0–7)
EPITH CASTS URNS QL MICRO: ABNORMAL /LPF
ERYTHROCYTE [DISTWIDTH] IN BLOOD BY AUTOMATED COUNT: 12.7 % (ref 11.5–14.5)
GLOBULIN SER CALC-MCNC: 3 G/DL (ref 2–4)
GLUCOSE SERPL-MCNC: 80 MG/DL (ref 65–100)
GLUCOSE UR STRIP.AUTO-MCNC: NEGATIVE MG/DL
HCT VFR BLD AUTO: 44.5 % (ref 36.6–50.3)
HGB BLD-MCNC: 14.6 G/DL (ref 12.1–17)
HGB UR QL STRIP: ABNORMAL
HYALINE CASTS URNS QL MICRO: ABNORMAL /LPF (ref 0–5)
IMM GRANULOCYTES # BLD AUTO: 0 K/UL (ref 0–0.04)
IMM GRANULOCYTES NFR BLD AUTO: 0 % (ref 0–0.5)
KETONES UR QL STRIP.AUTO: NEGATIVE MG/DL
LEUKOCYTE ESTERASE UR QL STRIP.AUTO: NEGATIVE
LYMPHOCYTES # BLD: 2 K/UL (ref 0.8–3.5)
LYMPHOCYTES NFR BLD: 25 % (ref 12–49)
MAGNESIUM SERPL-MCNC: 2.4 MG/DL (ref 1.6–2.4)
MCH RBC QN AUTO: 33.3 PG (ref 26–34)
MCHC RBC AUTO-ENTMCNC: 32.8 G/DL (ref 30–36.5)
MCV RBC AUTO: 101.4 FL (ref 80–99)
MONOCYTES # BLD: 0.8 K/UL (ref 0–1)
MONOCYTES NFR BLD: 10 % (ref 5–13)
NEUTS SEG # BLD: 4.9 K/UL (ref 1.8–8)
NEUTS SEG NFR BLD: 59 % (ref 32–75)
NITRITE UR QL STRIP.AUTO: NEGATIVE
NRBC # BLD: 0 K/UL (ref 0–0.01)
NRBC BLD-RTO: 0 PER 100 WBC
P-R INTERVAL, ECG05: 176 MS
PH UR STRIP: 6 (ref 5–8)
PLATELET # BLD AUTO: 303 K/UL (ref 150–400)
PMV BLD AUTO: 9.7 FL (ref 8.9–12.9)
POTASSIUM SERPL-SCNC: 4.4 MMOL/L (ref 3.5–5.1)
PROT SERPL-MCNC: 6.5 G/DL (ref 6.4–8.2)
PROT UR STRIP-MCNC: NEGATIVE MG/DL
Q-T INTERVAL, ECG07: 424 MS
QRS DURATION, ECG06: 114 MS
QTC CALCULATION (BEZET), ECG08: 430 MS
RBC # BLD AUTO: 4.39 M/UL (ref 4.1–5.7)
RBC #/AREA URNS HPF: ABNORMAL /HPF (ref 0–5)
SODIUM SERPL-SCNC: 133 MMOL/L (ref 136–145)
SP GR UR REFRACTOMETRY: 1.01 (ref 1–1.03)
UA: UC IF INDICATED,UAUC: ABNORMAL
UROBILINOGEN UR QL STRIP.AUTO: 0.2 EU/DL (ref 0.2–1)
VENTRICULAR RATE, ECG03: 62 BPM
WBC # BLD AUTO: 8.2 K/UL (ref 4.1–11.1)
WBC URNS QL MICRO: ABNORMAL /HPF (ref 0–4)

## 2023-04-13 PROCEDURE — 36415 COLL VENOUS BLD VENIPUNCTURE: CPT

## 2023-04-13 PROCEDURE — 93005 ELECTROCARDIOGRAM TRACING: CPT

## 2023-04-13 PROCEDURE — 81001 URINALYSIS AUTO W/SCOPE: CPT

## 2023-04-13 PROCEDURE — 80053 COMPREHEN METABOLIC PANEL: CPT

## 2023-04-13 PROCEDURE — 71046 X-RAY EXAM CHEST 2 VIEWS: CPT

## 2023-04-13 PROCEDURE — 83735 ASSAY OF MAGNESIUM: CPT

## 2023-04-13 PROCEDURE — 85025 COMPLETE CBC W/AUTO DIFF WBC: CPT

## 2023-04-13 NOTE — PERIOP NOTES
[address] INSTRUCTIONS    Surgery Date:   4/17/23    Your surgeon's office or [address] [place] [address] staff will call you between 4 PM- 8 PM the day before surgery with your arrival time. If your surgery is on a Monday, you will receive a call the preceding Friday. Please report to [facility] Patient Access/Admitting on the 1st floor. Bring your insurance card, photo identification, and any copayment ( if applicable). If you are going home the same day of your surgery, you must have a responsible adult to drive you home. You need to have a responsible adult to stay with you the first 24 hours after surgery and you should not drive a car for 24 hours following your surgery. Do NOT eat any solid foods after midnight the night before surgery including candy, mint or gum. You may drink clear liquids from midnight until 1 hour prior to your arrival. You may drink up to 12 ounces at one time every 4 hours. Please note special instructions, if applicable, below for medications. Do NOT drink alcohol or smoke 24 hours before surgery. STOP smoking for 14 days prior as it helps with breathing and healing after surgery. If you are being admitted to the hospital, please leave personal belongings/luggage in your car until you have an assigned hospital room number. Please wear comfortable clothes. Wear your glasses instead of contacts. We ask that all money, jewelry and valuables be left at home. Wear no make up, particularly mascara, the day of surgery. All body piercings, rings, and jewelry need to be removed and left at home. Please remove any nail polish or artifical nails from your fingernails. Please wear your hair loose or down. Please no pony-tails, buns, or any metal hair accessories. When you shower the morning of surgery, please do not apply any lotions or powders afterwards. You may wear deodorant, unless having breast surgery.   Do not shave any body area within 24 hours of your surgery. Please follow all instructions to avoid any potential surgical cancellation. Should your physical condition change, (i.e. fever, cold, flu, etc.) please notify your surgeon as soon as possible. It is important to be on time. If a situation occurs where you may be delayed, please call:  [phone] / [phone] on the day of surgery. The Preadmission Testing staff can be reached at [phone]. Special instructions: [address] DAY OF SURGERY      Current Outpatient Medications   Medication Sig    atorvastatin (LIPITOR) 10 mg tablet TAKE 1 TABLET BY MOUTH  DAILY (Patient taking differently: Take 1 Tablet by mouth nightly.)    prednisoLONE acetate (PRED FORTE) 1 % ophthalmic suspension Administer 1 Drop to left eye daily. timolol (TIMOPTIC) 0.5 % ophthalmic solution Administer 1 Drop to right eye daily. zolpidem (AMBIEN) 5 mg tablet TAKE ONE TABLET BY MOUTH ONCE AT BEDTIME IF NEEDED FOR SLEEP    hydrocortisone (HYTONE) 2.5 % topical cream Apply  to affected area two (2) times a day. use thin layer (Patient taking differently: Apply  to affected area two (2) times daily as needed. use thin layer)    sertraline (ZOLOFT) 50 mg tablet TAKE 1 TABLET BY MOUTH  DAILY    Cetirizine (ZyrTEC) 10 mg cap Take 10 mg by mouth daily as needed. cyanocobalamin 1,000 mcg tablet Take 2.5 Tablets by mouth daily. co-enzyme Q-10 (CO Q-10) 100 mg capsule Take 2 Capsules by mouth daily. Vit C-Vit E-Copper-ZnOx-Lutein 226 mg-200 unit -0.8 mg-5 mg cap Take 1 Tablet by mouth two (2) times a day. No current facility-administered medications for this encounter.         YOU MUST ONLY TAKE THESE MEDICATIONS THE MORNING OF SURGERY WITH A SIP OF WATER: PREDNISOLONE EYE DROP, TIMOLOL EYE DROP, SERTRALINE   MEDICATIONS TO TAKE THE MORNING OF SURGERY ONLY IF NEEDED: ZYRTEC  HOLD these prescription medications BEFORE Surgery: NONE  Ask your surgeon/prescribing physician about when/if to STOP taking these medications: NONE  Stop all vitamins, herbal medicines and Aspirin containing products 7 days prior to surgery. Stop any non-steroidal anti-inflammatory drugs (i.e. Ibuprofen, Naproxen, Advil, Aleve) 3 days before surgery. You may take Tylenol. If you are currently taking Plavix, Coumadin,or any other blood-thinning/anticoagulant medication contact your prescribing physician for instructions. Eating and Drinking Before Surgery    You may eat a regular dinner at the usual time on the day before your surgery. Do NOT eat any solid foods after midnight. You may drink clear liquids only from 12 midnight until 1 hours prior to your arrival time at the hospital on the day of your surgery. Do NOT drink alcohol. Clear liquids include:  Water  Fruit juices without pulp( i.e. apple juice)  Carbonated beverages  Black coffee (no cream/milk)  Tea (no cream/milk)  Gatorade  You may drink up to 12-16 ounces at one time every 4 hours between the hours of midnight and 1 hour before your arrival time at the hospital. Example- if your arrival time at the hospital is 6am, you may drink 12-16 ounces of clear liquids no later than 5am.  If you have any questions, please contact your surgeon's office. Preventing Infections Before and After - Your Surgery    IMPORTANT INSTRUCTIONS    You play an important role in your health and preparation for surgery. To reduce the germs on your skin you will need to shower with CHG soap (Chorhexidine gluconate 4%) two times before surgery. CHG soap (Hibiclens, Hex-A-Clens or store brand)  CHG soap will be provided at your Preadmission Testing (PAT) appointment. If you do not have a PAT appointment before surgery, you may arrange to  CHG soap from our office or purchase CHG soap at a pharmacy, grocery or department store. You need to purchase TWO 4 ounce bottles to use for your 2 showers.     Steps to follow:  Wash your hair with your normal shampoo and your body with regular soap and rinse well to remove shampoo and soap from your skin. Wet a clean washcloth and turn off the shower. Put CHG soap on washcloth and apply to your entire body from the neck down. Do not use on your head, face or private parts(genitals). Do not use CHG soap on open sores, wounds or areas of skin irritation. Wash you body gently for 5 minutes. Do not wash your skin too hard. This soap does not create lather. Pay special attention to your underarms and from your belly button to your feet. Turn the shower back on and rinse well to get CHG soap off your body. Pat your skin dry with a clean, dry towel. Do not apply lotions or moisturizer. Put on clean clothes and sleep on fresh bed sheets and do not allow pets to sleep with you. Shower with CHG soap 2 times before your surgery  The evening before your surgery  The morning of your surgery      Tips to help prevent infections after your surgery:  Protect your surgical wound from germs:  Hand washing is the most important thing you and your caregivers can do to prevent infections. Keep your bandage clean and dry! Do not touch your surgical wound. Use clean, freshly washed towels and washcloths every time you shower; do not share bath linens with others. Until your surgical wound is healed, wear clothing and sleep on bed linens each day that are clean. Do not allow pets to sleep in your bed with you or touch your surgical wound. Do not smoke - smoking delays wound healing. This may be a good time to stop smoking. If you have diabetes, it is important for you to manage your blood sugar levels properly before your surgery as well as after your surgery. Poorly managed blood sugar levels slow down wound healing and prevent you from healing completely. Patient Information Regarding COVID Restrictions      Day of Procedure    Please park in the parking deck or any designated visitor parking lot.   Enter the facility through the Main Entrance of the hospital.  On the day of surgery, please provide the cell phone number of the person who will be waiting for you to the Patient Access representative at the time of registration. Masks are highly recommended in the hospital, but not required. Once your procedure and the immediate recovery period is completed, a nurse in the recovery area will contact your designated visitor to inform them of your room number or to otherwise review other pertinent information regarding your care. Social distancing practices are strongly encouraged in waiting areas and the cafeteria. The patient was contacted  in person. He verbalized understanding of all instructions does not  need reinforcement.

## 2023-04-15 ENCOUNTER — ANESTHESIA EVENT (OUTPATIENT)
Dept: SURGERY | Age: 78
End: 2023-04-15
Payer: MEDICARE

## 2023-04-17 ENCOUNTER — ANESTHESIA (OUTPATIENT)
Dept: SURGERY | Age: 78
End: 2023-04-17
Payer: MEDICARE

## 2023-04-17 ENCOUNTER — HOSPITAL ENCOUNTER (OUTPATIENT)
Age: 78
Setting detail: OUTPATIENT SURGERY
Discharge: HOME | End: 2023-04-17
Attending: SURGERY | Admitting: SURGERY
Payer: MEDICARE

## 2023-04-17 VITALS
TEMPERATURE: 97.1 F | RESPIRATION RATE: 16 BRPM | OXYGEN SATURATION: 95 % | HEIGHT: 74 IN | HEART RATE: 69 BPM | SYSTOLIC BLOOD PRESSURE: 118 MMHG | BODY MASS INDEX: 22.07 KG/M2 | DIASTOLIC BLOOD PRESSURE: 74 MMHG | WEIGHT: 171.96 LBS

## 2023-04-17 DIAGNOSIS — K43.9 VENTRAL HERNIA WITHOUT OBSTRUCTION OR GANGRENE: Primary | ICD-10-CM

## 2023-04-17 DIAGNOSIS — K42.9 RECURRENT UMBILICAL HERNIA: ICD-10-CM

## 2023-04-17 PROCEDURE — 77030012770 HC TRCR OPT FX AMR -B: Performed by: SURGERY

## 2023-04-17 PROCEDURE — 74011250636 HC RX REV CODE- 250/636: Performed by: NURSE ANESTHETIST, CERTIFIED REGISTERED

## 2023-04-17 PROCEDURE — 77030040361 HC SLV COMPR DVT MDII -B: Performed by: SURGERY

## 2023-04-17 PROCEDURE — 77030040830 HC CATH URETH FOL MDII -A: Performed by: SURGERY

## 2023-04-17 PROCEDURE — 77030013079 HC BLNKT BAIR HGGR 3M -A: Performed by: STUDENT IN AN ORGANIZED HEALTH CARE EDUCATION/TRAINING PROGRAM

## 2023-04-17 PROCEDURE — 74011000250 HC RX REV CODE- 250: Performed by: NURSE ANESTHETIST, CERTIFIED REGISTERED

## 2023-04-17 PROCEDURE — 74011000250 HC RX REV CODE- 250: Performed by: SURGERY

## 2023-04-17 PROCEDURE — 74011250636 HC RX REV CODE- 250/636: Performed by: SURGERY

## 2023-04-17 PROCEDURE — 76060000033 HC ANESTHESIA 1 TO 1.5 HR: Performed by: SURGERY

## 2023-04-17 PROCEDURE — 2709999900 HC NON-CHARGEABLE SUPPLY: Performed by: SURGERY

## 2023-04-17 PROCEDURE — 77030019908 HC STETH ESOPH SIMS -A: Performed by: STUDENT IN AN ORGANIZED HEALTH CARE EDUCATION/TRAINING PROGRAM

## 2023-04-17 PROCEDURE — 76010000934 HC OR TIME 1 TO 1.5HR INTENSV - TIER 2: Performed by: SURGERY

## 2023-04-17 PROCEDURE — 77030002933 HC SUT MCRYL J&J -A: Performed by: SURGERY

## 2023-04-17 PROCEDURE — 76210000016 HC OR PH I REC 1 TO 1.5 HR: Performed by: SURGERY

## 2023-04-17 PROCEDURE — 77030031139 HC SUT VCRL2 J&J -A: Performed by: SURGERY

## 2023-04-17 PROCEDURE — 77030036554: Performed by: SURGERY

## 2023-04-17 PROCEDURE — 77030008684 HC TU ET CUF COVD -B: Performed by: STUDENT IN AN ORGANIZED HEALTH CARE EDUCATION/TRAINING PROGRAM

## 2023-04-17 PROCEDURE — 74011250637 HC RX REV CODE- 250/637: Performed by: ANESTHESIOLOGY

## 2023-04-17 PROCEDURE — 77030016151 HC PROTCTR LNS DFOG COVD -B: Performed by: SURGERY

## 2023-04-17 PROCEDURE — 76210000020 HC REC RM PH II FIRST 0.5 HR: Performed by: SURGERY

## 2023-04-17 PROCEDURE — 77030034628 HC LIGASURE LAP SEAL DIV MD COVD -F: Performed by: SURGERY

## 2023-04-17 PROCEDURE — 77030018548 HC SUT ETHBND2 J&J -B: Performed by: SURGERY

## 2023-04-17 PROCEDURE — 74011250636 HC RX REV CODE- 250/636: Performed by: ANESTHESIOLOGY

## 2023-04-17 PROCEDURE — 77030041238 HC TBNG INSUF MDII -A: Performed by: SURGERY

## 2023-04-17 PROCEDURE — 77030010507 HC ADH SKN DERMBND J&J -B: Performed by: SURGERY

## 2023-04-17 PROCEDURE — 49613 RPR AA HRN RCR < 3 RDC: CPT | Performed by: SURGERY

## 2023-04-17 RX ORDER — MIDAZOLAM HYDROCHLORIDE 1 MG/ML
0.5 INJECTION, SOLUTION INTRAMUSCULAR; INTRAVENOUS
Status: DISCONTINUED | OUTPATIENT
Start: 2023-04-17 | End: 2023-04-17 | Stop reason: HOSPADM

## 2023-04-17 RX ORDER — ONDANSETRON 2 MG/ML
4 INJECTION INTRAMUSCULAR; INTRAVENOUS AS NEEDED
Status: DISCONTINUED | OUTPATIENT
Start: 2023-04-17 | End: 2023-04-17 | Stop reason: HOSPADM

## 2023-04-17 RX ORDER — NEOSTIGMINE METHYLSULFATE 1 MG/ML
INJECTION, SOLUTION INTRAVENOUS AS NEEDED
Status: DISCONTINUED | OUTPATIENT
Start: 2023-04-17 | End: 2023-04-17 | Stop reason: HOSPADM

## 2023-04-17 RX ORDER — SODIUM CHLORIDE, SODIUM LACTATE, POTASSIUM CHLORIDE, CALCIUM CHLORIDE 600; 310; 30; 20 MG/100ML; MG/100ML; MG/100ML; MG/100ML
1000 INJECTION, SOLUTION INTRAVENOUS CONTINUOUS
Status: DISCONTINUED | OUTPATIENT
Start: 2023-04-17 | End: 2023-04-17 | Stop reason: HOSPADM

## 2023-04-17 RX ORDER — DIPHENHYDRAMINE HYDROCHLORIDE 50 MG/ML
12.5 INJECTION, SOLUTION INTRAMUSCULAR; INTRAVENOUS AS NEEDED
Status: DISCONTINUED | OUTPATIENT
Start: 2023-04-17 | End: 2023-04-17 | Stop reason: HOSPADM

## 2023-04-17 RX ORDER — ACETAMINOPHEN 325 MG/1
650 TABLET ORAL ONCE
Status: COMPLETED | OUTPATIENT
Start: 2023-04-17 | End: 2023-04-17

## 2023-04-17 RX ORDER — FENTANYL CITRATE 50 UG/ML
50 INJECTION, SOLUTION INTRAMUSCULAR; INTRAVENOUS AS NEEDED
Status: DISCONTINUED | OUTPATIENT
Start: 2023-04-17 | End: 2023-04-17 | Stop reason: HOSPADM

## 2023-04-17 RX ORDER — FENTANYL CITRATE 50 UG/ML
INJECTION, SOLUTION INTRAMUSCULAR; INTRAVENOUS AS NEEDED
Status: DISCONTINUED | OUTPATIENT
Start: 2023-04-17 | End: 2023-04-17 | Stop reason: HOSPADM

## 2023-04-17 RX ORDER — MIDAZOLAM HYDROCHLORIDE 1 MG/ML
1 INJECTION, SOLUTION INTRAMUSCULAR; INTRAVENOUS AS NEEDED
Status: DISCONTINUED | OUTPATIENT
Start: 2023-04-17 | End: 2023-04-17 | Stop reason: HOSPADM

## 2023-04-17 RX ORDER — SUCCINYLCHOLINE CHLORIDE 20 MG/ML
INJECTION INTRAMUSCULAR; INTRAVENOUS AS NEEDED
Status: DISCONTINUED | OUTPATIENT
Start: 2023-04-17 | End: 2023-04-17 | Stop reason: HOSPADM

## 2023-04-17 RX ORDER — LIDOCAINE HYDROCHLORIDE 10 MG/ML
0.1 INJECTION, SOLUTION EPIDURAL; INFILTRATION; INTRACAUDAL; PERINEURAL AS NEEDED
Status: DISCONTINUED | OUTPATIENT
Start: 2023-04-17 | End: 2023-04-17 | Stop reason: HOSPADM

## 2023-04-17 RX ORDER — ROCURONIUM BROMIDE 10 MG/ML
INJECTION, SOLUTION INTRAVENOUS AS NEEDED
Status: DISCONTINUED | OUTPATIENT
Start: 2023-04-17 | End: 2023-04-17 | Stop reason: HOSPADM

## 2023-04-17 RX ORDER — BUPIVACAINE HYDROCHLORIDE 5 MG/ML
30 INJECTION, SOLUTION EPIDURAL; INTRACAUDAL ONCE
Status: COMPLETED | OUTPATIENT
Start: 2023-04-17 | End: 2023-04-17

## 2023-04-17 RX ORDER — FENTANYL CITRATE 50 UG/ML
25 INJECTION, SOLUTION INTRAMUSCULAR; INTRAVENOUS
Status: DISCONTINUED | OUTPATIENT
Start: 2023-04-17 | End: 2023-04-17 | Stop reason: HOSPADM

## 2023-04-17 RX ORDER — ONDANSETRON 2 MG/ML
INJECTION INTRAMUSCULAR; INTRAVENOUS AS NEEDED
Status: DISCONTINUED | OUTPATIENT
Start: 2023-04-17 | End: 2023-04-17 | Stop reason: HOSPADM

## 2023-04-17 RX ORDER — GLYCOPYRROLATE 0.2 MG/ML
0.2 INJECTION INTRAMUSCULAR; INTRAVENOUS
Status: DISCONTINUED | OUTPATIENT
Start: 2023-04-17 | End: 2023-04-17 | Stop reason: HOSPADM

## 2023-04-17 RX ORDER — EPHEDRINE SULFATE/0.9% NACL/PF 50 MG/5 ML
SYRINGE (ML) INTRAVENOUS AS NEEDED
Status: DISCONTINUED | OUTPATIENT
Start: 2023-04-17 | End: 2023-04-17 | Stop reason: HOSPADM

## 2023-04-17 RX ORDER — ALBUTEROL SULFATE 0.83 MG/ML
2.5 SOLUTION RESPIRATORY (INHALATION) AS NEEDED
Status: DISCONTINUED | OUTPATIENT
Start: 2023-04-17 | End: 2023-04-17 | Stop reason: HOSPADM

## 2023-04-17 RX ORDER — MIDAZOLAM HYDROCHLORIDE 1 MG/ML
INJECTION, SOLUTION INTRAMUSCULAR; INTRAVENOUS AS NEEDED
Status: DISCONTINUED | OUTPATIENT
Start: 2023-04-17 | End: 2023-04-17 | Stop reason: HOSPADM

## 2023-04-17 RX ORDER — POLYETHYLENE GLYCOL 3350 17 G/17G
17 POWDER, FOR SOLUTION ORAL DAILY
Qty: 20 PACKET | Refills: 0 | Status: SHIPPED | OUTPATIENT
Start: 2023-04-17 | End: 2023-05-07

## 2023-04-17 RX ORDER — PROPOFOL 10 MG/ML
INJECTION, EMULSION INTRAVENOUS AS NEEDED
Status: DISCONTINUED | OUTPATIENT
Start: 2023-04-17 | End: 2023-04-17 | Stop reason: HOSPADM

## 2023-04-17 RX ORDER — HYDROCODONE BITARTRATE AND ACETAMINOPHEN 5; 325 MG/1; MG/1
1 TABLET ORAL AS NEEDED
Status: DISCONTINUED | OUTPATIENT
Start: 2023-04-17 | End: 2023-04-17 | Stop reason: HOSPADM

## 2023-04-17 RX ORDER — HYDROCODONE BITARTRATE AND ACETAMINOPHEN 5; 325 MG/1; MG/1
1 TABLET ORAL
Qty: 15 TABLET | Refills: 0 | Status: SHIPPED | OUTPATIENT
Start: 2023-04-17 | End: 2023-04-21

## 2023-04-17 RX ORDER — SODIUM CHLORIDE 9 MG/ML
25 INJECTION, SOLUTION INTRAVENOUS CONTINUOUS
Status: DISCONTINUED | OUTPATIENT
Start: 2023-04-17 | End: 2023-04-17 | Stop reason: HOSPADM

## 2023-04-17 RX ORDER — GLYCOPYRROLATE 0.2 MG/ML
INJECTION INTRAMUSCULAR; INTRAVENOUS AS NEEDED
Status: DISCONTINUED | OUTPATIENT
Start: 2023-04-17 | End: 2023-04-17 | Stop reason: HOSPADM

## 2023-04-17 RX ORDER — LIDOCAINE HYDROCHLORIDE 20 MG/ML
INJECTION, SOLUTION EPIDURAL; INFILTRATION; INTRACAUDAL; PERINEURAL AS NEEDED
Status: DISCONTINUED | OUTPATIENT
Start: 2023-04-17 | End: 2023-04-17 | Stop reason: HOSPADM

## 2023-04-17 RX ORDER — ROPIVACAINE HYDROCHLORIDE 5 MG/ML
30 INJECTION, SOLUTION EPIDURAL; INFILTRATION; PERINEURAL AS NEEDED
Status: DISCONTINUED | OUTPATIENT
Start: 2023-04-17 | End: 2023-04-17 | Stop reason: HOSPADM

## 2023-04-17 RX ORDER — HYDROMORPHONE HYDROCHLORIDE 1 MG/ML
0.2 INJECTION, SOLUTION INTRAMUSCULAR; INTRAVENOUS; SUBCUTANEOUS
Status: DISCONTINUED | OUTPATIENT
Start: 2023-04-17 | End: 2023-04-17 | Stop reason: HOSPADM

## 2023-04-17 RX ORDER — SODIUM CHLORIDE, SODIUM LACTATE, POTASSIUM CHLORIDE, CALCIUM CHLORIDE 600; 310; 30; 20 MG/100ML; MG/100ML; MG/100ML; MG/100ML
INJECTION, SOLUTION INTRAVENOUS
Status: DISCONTINUED | OUTPATIENT
Start: 2023-04-17 | End: 2023-04-17 | Stop reason: HOSPADM

## 2023-04-17 RX ADMIN — SODIUM CHLORIDE, POTASSIUM CHLORIDE, SODIUM LACTATE AND CALCIUM CHLORIDE 1000 ML: 600; 310; 30; 20 INJECTION, SOLUTION INTRAVENOUS at 09:04

## 2023-04-17 RX ADMIN — Medication 5 MG: at 10:08

## 2023-04-17 RX ADMIN — SUCCINYLCHOLINE CHLORIDE 160 MG: 20 INJECTION, SOLUTION INTRAMUSCULAR; INTRAVENOUS at 09:55

## 2023-04-17 RX ADMIN — ONDANSETRON HYDROCHLORIDE 4 MG: 2 INJECTION, SOLUTION INTRAMUSCULAR; INTRAVENOUS at 10:42

## 2023-04-17 RX ADMIN — Medication 5 MG: at 10:10

## 2023-04-17 RX ADMIN — Medication 5 MG: at 10:12

## 2023-04-17 RX ADMIN — FENTANYL CITRATE 50 MCG: 50 INJECTION, SOLUTION INTRAMUSCULAR; INTRAVENOUS at 09:50

## 2023-04-17 RX ADMIN — Medication 5 MG: at 10:15

## 2023-04-17 RX ADMIN — SODIUM CHLORIDE, POTASSIUM CHLORIDE, SODIUM LACTATE AND CALCIUM CHLORIDE: 600; 310; 30; 20 INJECTION, SOLUTION INTRAVENOUS at 09:47

## 2023-04-17 RX ADMIN — PROPOFOL 150 MG: 10 INJECTION, EMULSION INTRAVENOUS at 09:55

## 2023-04-17 RX ADMIN — ACETAMINOPHEN 650 MG: 325 TABLET ORAL at 09:09

## 2023-04-17 RX ADMIN — ONDANSETRON 4 MG: 2 INJECTION INTRAMUSCULAR; INTRAVENOUS at 11:21

## 2023-04-17 RX ADMIN — MIDAZOLAM 1 MG: 1 INJECTION INTRAMUSCULAR; INTRAVENOUS at 09:50

## 2023-04-17 RX ADMIN — VANCOMYCIN HYDROCHLORIDE 1000 MG: 1 INJECTION, POWDER, LYOPHILIZED, FOR SOLUTION INTRAVENOUS at 09:11

## 2023-04-17 RX ADMIN — LIDOCAINE HYDROCHLORIDE 80 MG: 20 INJECTION, SOLUTION EPIDURAL; INFILTRATION; INTRACAUDAL; PERINEURAL at 09:55

## 2023-04-17 RX ADMIN — ROCURONIUM BROMIDE 25 MG: 10 SOLUTION INTRAVENOUS at 10:00

## 2023-04-17 RX ADMIN — NEOSTIGMINE METHYLSULFATE 4 MG: 1 INJECTION, SOLUTION INTRAVENOUS at 10:36

## 2023-04-17 RX ADMIN — GLYCOPYRROLATE 0.4 MG: 0.2 INJECTION INTRAMUSCULAR; INTRAVENOUS at 10:36

## 2023-04-17 RX ADMIN — Medication 5 MG: at 10:07

## 2023-04-17 RX ADMIN — ROCURONIUM BROMIDE 5 MG: 10 SOLUTION INTRAVENOUS at 09:55

## 2023-04-17 RX ADMIN — Medication 5 MG: at 10:05

## 2023-04-17 NOTE — ANESTHESIA POSTPROCEDURE EVALUATION
Post-Anesthesia Evaluation and Assessment    Patient: Adam Diaz MRN: [id number]  SSN: [id number]    YOB: 1945  Age: 78 y.o. Sex: male      I have evaluated the patient and they are stable and ready for discharge from the PACU. Cardiovascular Function/Vital Signs  Visit Vitals  /74   Pulse 69   Temp 36.2 °C (97.1 °F)   Resp 16   Ht 6' 2\" (1.88 m)   Wt 78 kg (171 lb 15.3 oz)   SpO2 95%   BMI 22.08 kg/m²       Patient is status post General anesthesia for Procedure(s):  ROBOTIC ASSISTED LAPAROSCOPIC REPAIR OF RECURRENT UMBILICAL HERNIA, CONVERT TO OPEN (). Nausea/Vomiting: None    Postoperative hydration reviewed and adequate. Pain:  Pain Scale 1: Numeric (0 - 10) (04/17/23 1200)  Pain Intensity 1: 0 (04/17/23 1200)   Managed    Neurological Status:   Neuro (WDL): Within Defined Limits (04/17/23 1200)  Neuro  Neurologic State: Alert (04/17/23 1200)   At baseline    Mental Status, Level of Consciousness: Alert and  oriented to person, place, and time    Pulmonary Status:   O2 Device: None (Room air) (04/17/23 1200)   Adequate oxygenation and airway patent    Complications related to anesthesia: None    Post-anesthesia assessment completed. No concerns    Signed By: Stephanie Johnson DO     April 17, 2023                Procedure(s):  ROBOTIC ASSISTED LAPAROSCOPIC REPAIR OF RECURRENT UMBILICAL HERNIA, CONVERT TO OPEN (). general    <[email or web address]>    INITIAL Post-op Vital signs:   Vitals Value Taken Time   /88 04/17/23 1215   Temp 36.2 °C (97.1 °F) 04/17/23 1200   Pulse 75 04/17/23 1217   Resp 15 04/17/23 1217   SpO2 96 % 04/17/23 1217   Vitals shown include unvalidated device data.

## 2023-04-17 NOTE — BRIEF OP NOTE
Brief Postoperative Note    Patient: Billie Hauser Sr  YOB: 1945  MRN: [id number]    Date of Procedure: 4/17/2023     Pre-Op Diagnosis: RECURRENT UMBILICAL HERNIA    Post-Op Diagnosis: Same as preoperative diagnosis. Procedure(s):  ROBOTIC ASSISTED LAPAROSCOPIC REPAIR OF RECURRENT UMBILICAL HERNIA, CONVERT TO OPEN ()    Surgeon(s):  James Porras MD    Surgical Assistant: Surg Asst-1: Katlyn Mcdonald    Anesthesia: General     Estimated Blood Loss (mL): Minimal    Complications: None    Specimens: * No specimens in log *     Implants: * No implants in log *    Drains: * No LDAs found *    Findings: Dense intra-abdominal adhesion; open primary repair of 2 x 1 cm recurrent umbilical defect.     Electronically Signed by Evy Voss MD on 4/17/2023 at 10:33 AM

## 2023-04-17 NOTE — ANESTHESIA PREPROCEDURE EVALUATION
Relevant Problems   No relevant active problems       Anesthetic History   No history of anesthetic complications            Review of Systems / Medical History  Patient summary reviewed, nursing notes reviewed and pertinent labs reviewed    Pulmonary      Recent URI: resolved             Neuro/Psych         Psychiatric history     Cardiovascular              Hyperlipidemia    Exercise tolerance: >4 METS     GI/Hepatic/Renal     GERD: well controlled           Endo/Other        Arthritis     Other Findings              Physical Exam    Airway  Mallampati: I  TM Distance: 4 - 6 cm  Neck ROM: normal range of motion   Mouth opening: Normal     Cardiovascular  Regular rate and rhythm,  S1 and S2 normal,  no murmur, click, rub, or gallop             Dental    Dentition: Upper dentition intact, Lower dentition intact and Implants     Pulmonary  Breath sounds clear to auscultation               Abdominal  GI exam deferred       Other Findings            Anesthetic Plan    ASA: 2  Anesthesia type: general          Induction: Intravenous  Anesthetic plan and risks discussed with: Patient

## 2023-04-17 NOTE — OP NOTES
[address]   OPERATIVE REPORT    Name:  Delfina Taylor  MR#:  [id number]  :  1945  ACCOUNT #:  [de-identified]  DATE OF SERVICE:  2023    PREOPERATIVE DIAGNOSIS:  Recurrent umbilical hernia. POSTOPERATIVE DIAGNOSIS:  Recurrent umbilical hernia. PROCEDURE PERFORMED:  Robotic converted to open repair of recurrent umbilical hernia (CPT 35404). SURGEON:  Nir Corcoran MD    ASSISTANT:  SA Shay    ANESTHESIA:  General endotracheal.    COMPLICATIONS:  None. SPECIMENS REMOVED:  None. IMPLANTS:  None. ESTIMATED BLOOD LOSS:  20 mL. DRAINS:  None. COUNTS:  Sponge count correct. Needle count correct. FINDINGS:  1. Dense intra-abdominal adhesions, leading to conversion to open repair. 2.  Open primary repair of 2-cm x 1-cm recurrent umbilical defect. INDICATIONS:  The patient is a 72-year-old white male with a history of previous robotic-assisted laparoscopic incisional and umbilical hernia repair with mesh using a preperitoneal technique. He has subsequently developed recurrent bulge at the umbilical area. On exam, he has a tender bulge, and CT scan confirms a fat-containing recurrent umbilical hernia. He is taken to the operating today for repair. PROCEDURE:  The patient was identified as the correct patient in the preoperative holding area and informed consent was confirmed. After answering the patient's remaining questions, he was taken to the operating room and placed on the operating room table in the supine position. Sequential compression devices were placed on both lower extremities. Following the uneventful initiation of general anesthesia, he was carefully secured to the operating room table with a safety strap in place. All potential pressure points were padded with eggcrate. His arms were tucked to his sides. His abdomen was prepped and draped in the usual sterile fashion.   Final time-out was performed, it was confirmed he had received intravenous antibiotics. A 5-mm trocar was inserted through a small right upper quadrant skin incision using an Optiview technique. After confirming intraperitoneal location of the trocar tip, insufflation with carbon dioxide gas was initiated. Once adequate working sites had been developed, the 5-mm, 30-degree laparoscope was inserted. No signs of trocar injury were present. Dense omental adhesions to the anterior abdominal wall in the area of prior hernia repair were present. Given the laparoscopic findings, the decision was made to convert to open repair. Pneumoperitoneum was maintained. A 2.5-cm incision was made beneath the umbilicus. The dissection was carried through the dermis, into the subcutaneous fat, with circumferential dissection of the umbilical stalk. The umbilical stalk was divided beneath the dermis and freeing hernia sac containing fat and release of pneumoperitoneum. Intra-abdominal fat and preperitoneal fat were reduced beneath the fascia, better delineating a 2-cm x 1-cm umbilical fascial defect. The defect was closed with multiple interrupted 0 Ethibond sutures. The wound was irrigated with sterile saline. After confirming satisfactory hemostasis, the umbilical dermis was secured to the underlying fascial repair with a 3-0 Vicryl suture. The deep dermal layer was reapproximated with multiple interrupted 3-0 Vicryl sutures. Skin edges were reapproximated with a combination of subcuticular 4-0 Monocryl suture and Dermabond. The patient tolerated the procedure well. He was extubated in the operating room and transported to the recovery area in stable condition. The attending surgeon, Dr. Candi Saunders, was scrubbed and present for the entire procedure.       Kris Cortez MD      [id number]  D:  04/17/2023 12:39  T:  04/17/2023 13:16  JOB #:  [id number]

## 2023-04-17 NOTE — H&P
Subjective:      Colby Cardona is a 78 y.o. male 9 months following robotic assisted laparoscopic incisional/umbilical hernia repair with mesh (preperitoneal mesh placement). Bowel movements are regular. He notes recurrent bulge in the area of umbilicus, with pain to the right of umbilicus. No nausea, vomiting, constipation. He performs physical therapy for lower extremity degenerative joint disease. Patient Active Problem List    Diagnosis Date Noted    Abdominal pain, periumbilical 82/19/2283    Ventral hernia without obstruction or gangrene 12/07/2020    Adenomatous polyp of ascending colon 12/05/2019    Mild depression 12/04/2018    Prehypertension 11/28/2016    Dyslipidemia, goal LDL below 100 11/04/2012    Schwannoma of spinal cord ([address] 11/01/2012    Anxiety associated with depression     Prostate cancer ([address]     Incidental lung nodule, > 3mm and < 8mm 06/14/2011    Pulmonary nodule 01/28/2011    Anxiety 09/14/2010     Past Medical History:   Diagnosis Date    Anxiety associated with depression     Arthritis     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    Contact dermatitis and eczema due to cause 2014/2015    Possible Lyme/Poison Ivy    Depression     GERD (gastroesophageal reflux disease)     Glaucoma     w/uveitis & detached retina    Hypercholesterolemia     Incidental lung nodule, > 3mm and < 8mm 06/14/2011    Optic neuritis, left     Prostate cancer ([address]     prostatectomy 9/2011    Schwannoma of spinal cord ([address] 11/01/2012    Trauma 1980    Head Wound      Past Surgical History:   Procedure Laterality Date    ENDOSCOPY, COLON, DIAGNOSTIC  2009    HX CATARACT REMOVAL      BILATERAL    HX COLONOSCOPY  2019    HX ENDOSCOPY  2018    HX GI  2003    splenectomy    HX HEENT  2009    DETACHED RETINA 2X LEFT EYE    HX HEENT  2009    CORNEA TRANSPLANT LEFT EYE    HX HERNIA REPAIR  1970, 1994    BILATERAL    HX HERNIA REPAIR  09/28/2011    Diag.  Lap. and repair of incisional hernia with mesh HX HERNIA REPAIR  2022    Robotic-assisted laparoscopic preperitoneal hernia repair with mesh by Dr. Armani Pineda [place] [facility] [place]    HX PROSTATECTOMY      HX UROLOGICAL      robotic prostatectomy    HX WISDOM TEETH EXTRACTION      ME LAPS SURG [id number] RPBIC RAD W/NRV SPARING ROBOT  2011    ME REPAIR FIRST ABDOMINAL WALL HERNIA  2011    diagnostic lap. wih mesh    ME UNLISTED PROCEDURE ABDOMEN PERITONEUM & OMENTUM  2003    SPLENECTOMY      Social History     Tobacco Use    Smoking status: Former     Packs/day: 1.00     Years: 5.00     Pack years: 5.00     Types: Cigarettes     Quit date: 1975     Years since quittin.3     Passive exposure: Past    Smokeless tobacco: Never   Substance Use Topics    Alcohol use: Not Currently     Comment: RARE      Family History   Problem Relation Age of Onset    Liver Disease Mother     Alcohol abuse Mother     Gall Bladder Disease Mother         Surgicaly removed    Headache Mother         Migraine    Migraines Mother     Heart Disease Father 68    Elevated Lipids Father     Alcohol abuse Father     Cancer Sister         cervical    Cancer Brother         PROSTATE    Cancer Sister         BRAIN    Diabetes Maternal Grandmother     Cancer Sister         Lung    Cancer Sister         Lung    Cancer Brother         Prostate    Anesth Problems Neg Hx       Current Facility-Administered Medications   Medication Dose Route Frequency    vancomycin (VANCOCIN) 1,000 mg in 0.9% sodium chloride 250 mL ([id number])  1,000 mg IntraVENous ON CALL TO OR    lactated Ringers infusion 1,000 mL  1,000 mL IntraVENous CONTINUOUS    0.9% sodium chloride infusion  25 mL/hr IntraVENous CONTINUOUS    lidocaine (PF) (XYLOCAINE) 10 mg/mL (1 %) injection 0.1 mL  0.1 mL SubCUTAneous PRN    fentaNYL citrate (PF) injection 50 mcg  50 mcg IntraVENous PRN    midazolam (VERSED) injection 1 mg  1 mg IntraVENous PRN    midazolam (VERSED) injection 1 mg  1 mg IntraVENous PRN    glycopyrrolate (ROBINUL) injection 0.2 mg  0.2 mg IntraVENous ONCE PRN    ROPivacaine (PF) (NAROPIN) 5 mg/mL (0.5 %) injection 30 mL  30 mL Peripheral Nerve Block PRN      Allergies   Allergen Reactions    Doxycycline Nausea and Vomiting     N&V, DIARRHEA    Iodinated Contrast Media Nausea and Vomiting    Iodine Nausea and Vomiting    Morphine Hives    Pcn [Penicillins] Hives     No reported severe non-IgE mediated response    Tamiflu [Oseltamivir] Rash       Review of Systems:    A complete review of systems was negative except as noted in the HPI. Objective:      Visit Vitals  /85 (BP 1 Location: Left upper arm)   Pulse 68   Temp 97.7 °F (36.5 °C)   Resp 18   Ht 6' 2\" (1.88 m)   Wt 171 lb 15.3 oz (78 kg)   SpO2 96%   BMI 22.08 kg/m²       Physical Exam:  GENERAL: alert, cooperative, no distress, appears stated age, EYE: negative, THROAT & NECK: normal, LUNG: clear to auscultation bilaterally, HEART: regular rate and rhythm, S1, S2 normal, no murmur. ABDOMEN: NABS, non-distended, soft. Tender, reducible umbilical hernia. EXTREMITIES:  extremities normal, atraumatic, no cyanosis or edema, SKIN: Normal., NEUROLOGIC: negative. Assessment:     Recurrent umbilical hernia. Plan:     1. I recommend proceeding with Robotic-assisted laparoscopic umbilical hernia with mesh. 2. Discussed aspects of surgical intervention, methods, risks (including by not limited to infection, bleeding, hematoma, recurrence, open procedure, and perforation of the intestines or solid organs), and the risks of general anesthetic. The patient understands the risks; all questions were answered to the patient's satisfaction. 3. Patient wishes to proceed with surgery.

## 2023-04-17 NOTE — PERIOP NOTES
Patient: Jakob Kennedy MRN: [id number]  SSN: [id number]   YOB: 1945  Age: 78 y.o. Sex: male     Patient is status post Procedure(s):  ROBOTIC ASSISTED LAPAROSCOPIC REPAIR OF RECURRENT UMBILICAL HERNIA, CONVERT TO OPEN (). Surgeon(s) and Role:     Jonnie Latif MD - Primary    Local/Dose/Irrigation:  0.5% BUPIVACAINE                  Peripheral IV 04/17/23 Left Wrist (Active)            Airway - Endotracheal Tube 04/17/23 Oral (Active)                   Dressing/Packing:  Incision 04/17/23 Abdomen-Dressing/Treatment: Surgical glue; Other (Comment);Pressure dressing (ABDOMINAL BINDER) (04/17/23 1040)    Splint/Cast:  ]    Other:

## 2023-04-24 ENCOUNTER — TELEPHONE (OUTPATIENT)
Dept: SURGERY | Age: 78
End: 2023-04-24

## 2023-04-24 NOTE — TELEPHONE ENCOUNTER
I called the patient and I let him know that he could remove the \"plug\" in his naval. He said he had removed the outer dressing already. Pt in agreement.

## 2023-04-24 NOTE — TELEPHONE ENCOUNTER
Patient would like to know if he is able to take the plug out of his naval area from his procedure on 4/17.

## 2023-05-01 ENCOUNTER — OFFICE VISIT (OUTPATIENT)
Dept: SURGERY | Age: 78
End: 2023-05-01
Payer: MEDICARE

## 2023-05-01 VITALS
RESPIRATION RATE: 18 BRPM | HEIGHT: 74 IN | WEIGHT: 175 LBS | OXYGEN SATURATION: 95 % | TEMPERATURE: 97.4 F | DIASTOLIC BLOOD PRESSURE: 72 MMHG | HEART RATE: 62 BPM | SYSTOLIC BLOOD PRESSURE: 109 MMHG | BODY MASS INDEX: 22.46 KG/M2

## 2023-05-01 DIAGNOSIS — Z09 POSTOPERATIVE EXAMINATION: Primary | ICD-10-CM

## 2023-05-01 PROCEDURE — 99024 POSTOP FOLLOW-UP VISIT: CPT

## 2023-05-01 NOTE — PROGRESS NOTES
Identified pt with two pt identifiers (name and ). Reviewed chart in preparation for visit and have obtained necessary documentation. Gerhardt Ginger is a 78 y.o. male  Chief Complaint   Patient presents with    Post OP Follow Up     2023 BC: ROBOTIC ASSISTED LAPAROSCOPIC REPAIR OF RECURRENT INCISIONAL HERNIA REPAIR WITH MESH, POST OP     Visit Vitals  /72 (BP 1 Location: Left upper arm, BP Patient Position: Sitting, BP Cuff Size: Adult)   Pulse 62   Temp 97.4 °F (36.3 °C) (Oral)   Resp 18   Ht 6' 2\" (1.88 m)   Wt 175 lb (79.4 kg)   SpO2 95%   BMI 22.47 kg/m²       1. Have you been to the ER, urgent care clinic since your last visit? Hospitalized since your last visit? No    2. Have you seen or consulted any other health care providers outside of the [address] [place] [address] since your last visit? Include any pap smears or colon screening.  No

## 2023-05-01 NOTE — PROGRESS NOTES
CC: Post Operative state    Subjective:     Oxana Hamilton is a 78 y.o. male presents for postop care 2 weeks s/p Robotic converted to open repair of recurrent umbilical hernia. Patient states he believes he is doing well postoperatively. Reports pain is minimal and well-tolerated without any pain medication. States his pain is mainly irritation from clothing rubbing up against his surgical incision. Tolerating a regular diet; No nausea or vomiting. Bowel movements are regular and he is voiding without difficulty. Patient denies fever, chest pain, or shortness of breath. Review of Systems:  A comprehensive review of systems was negative except for that written above      Mr. Jay Riojas has a reminder for a \"due or due soon\" health maintenance. I have asked that he contact his primary care provider for follow-up on this health maintenance. Objective:     Visit Vitals  /72 (BP 1 Location: Left upper arm, BP Patient Position: Sitting, BP Cuff Size: Adult)   Pulse 62   Temp 97.4 °F (36.3 °C) (Oral)   Resp 18   Ht 6' 2\" (1.88 m)   Wt 175 lb (79.4 kg)   SpO2 95%   BMI 22.47 kg/m²       General: alert, cooperative, no distress, appears stated age  CV: Regular rate and rhythm  Pulmonary: Lungs clear to auscultation   Abdomen:soft, bowel sounds active, non-tender  Incision:  healing well, no drainage, no erythema, no swelling, no dehiscence, incision well approximated    Assessment:       ICD-10-CM ICD-9-CM    1. Postoperative examination  Z09 V67.00           Plan:   2+ weeks s/p Robotic converted to open repair of recurrent umbilical hernia. Reassured patient that surgical site was healing well. Covered surgical incision with a large Band-Aid to help prevent irritation from clothing. May use large Band-Aid as needed. Wound care discussed. May submerge in water and continue to wash with mild soap and water. May moisturize surgical sites as desired.    No lifting greater than 15 pounds x 4 weeks then may advance activity as tolerated. May use heating pad and abdominal supportive wear as needed for pain/abdominal soreness. Zuleima Quinones Sr verbalized understanding and questions were answered to the best of my knowledge and ability. Return precautions discussed. Instructed patient to call with any questions or concerns.   Discharged from surgical care with prn follow up         > 15 minutes were spent with patient with greater than 50% of that time spent face to face counseling    Case Melendez NP  [facility]   5/1/2023

## 2023-05-25 DIAGNOSIS — F41.8 OTHER SPECIFIED ANXIETY DISORDERS: Primary | ICD-10-CM

## 2023-05-25 RX ORDER — ZOLPIDEM TARTRATE 5 MG/1
TABLET ORAL
Qty: 10 TABLET | Refills: 0 | Status: SHIPPED | OUTPATIENT
Start: 2023-05-25 | End: 2023-06-04

## 2023-09-25 NOTE — MR AVS SNAPSHOT
[facility]  Cardiovascular Disease  [address] [place] [address]  Phone: [phone]  Fax: [phone]  Follow Up Time: 1-3 days   Visit Information Date & Time Provider Department Dept. Phone Encounter #  
 8/30/2017 10:15 AM Lieutenant Toney MD [facility] [phone] [id number] Your Appointments 11/29/2017  9:15 AM  
PHYSICAL with Lieutenant Toney MD  
[facility] [address]) Appt Note: [address] [facility] [address] [facility] [id number] [place] [address] [address] [address] Upcoming Health Maintenance Date Due  
 GLAUCOMA SCREENING Q2Y 6/11/2017 INFLUENZA AGE 9 TO ADULT 8/1/2017 MEDICARE YEARLY EXAM 11/29/2017 COLONOSCOPY 7/1/2019 DTaP/Tdap/Td series (2 - Td) 9/14/2020 Allergies as of 8/30/2017  Review Complete On: 8/30/2017 By: Raghavendra Sheridan LPN Severity Noted Reaction Type Reactions Iodinated Contrast- Oral And Iv Dye  09/19/2011    Nausea and Vomiting Iodine  09/19/2011    Nausea and Vomiting Morphine  09/26/2011    Hives Pcn [Penicillins]  09/14/2010    Hives Current Immunizations  Reviewed on 11/28/2016 Name Date Influenza High Dose Vaccine PF 11/3/2016, 10/27/2015 Influenza Vaccine 11/13/2013 Influenza Vaccine (Quad) PF 11/17/2014 Influenza Vaccine Split 11/1/2012 TDAP Vaccine 9/14/2010 ZZZ-RETIRED (DO NOT USE) Pneumococcal Vaccine (Unspecified Type) 11/1/2012, 1/2/2003 Zoster 1/2/2009 Not reviewed this visit You Were Diagnosed With   
  
 Codes Comments Tick bite, initial encounter    -  Primary ICD-10-CM: W57. Patten Mention ICD-9-CM: 919.4, E906.4 Vitals BP Pulse Temp Resp Height(growth percentile) Weight(growth percentile) 132/82 64 97 °F (36.1 °C) (Oral) 16 6' 2\" (1.88 m) 169 lb (76.7 kg) SpO2 BMI Smoking Status 98% 21.7 kg/m2 Former Smoker Vitals History BMI and BSA Data Body Mass Index Body Surface Area 21.7 kg/m 2 2 m 2 Preferred Pharmacy Pharmacy Name Phone [facility], [address] [place] [address] Amaris Whelan [phone] Your Updated Medication List  
  
   
This list is accurate as of: 8/30/17 11:01 AM.  Always use your most recent med list.  
  
  
  
  
 atorvastatin 10 mg tablet Commonly known as:  LIPITOR  
TAKE ONE TABLET BY MOUTH DAILY COSOPT OP Apply  to eye. PRESERVISION LUTEIN 226 mg-200 unit -5 mg-0.8 mg Cap Generic drug:  Vit C-Vit E-Copper-ZnOx-Lutein Take  by mouth daily. zolpidem 5 mg tablet Commonly known as:  AMBIEN Take 1 Tab by mouth nightly as needed for Sleep. Max Daily Amount: 5 mg. We Performed the Following CBC WITH AUTOMATED DIFF [52329 CPT(R)] LYME AB TOTAL W/RFLX W BLOT [[id number] Custom] Introducing [facility]! Dear Bryan Dears: Thank you for requesting a [employer] account. Our records indicate that you already have an active [employer] account. You can access your account anytime at [email or web address] Did you know that you can access your hospital and ER discharge instructions at any time in [employer]? You can also review all of your test results from your hospital stay or ER visit. Additional Information If you have questions, please visit the Frequently Asked Questions section of the [employer] website at [email or web address] Remember, [employer] is NOT to be used for urgent needs. For medical emergencies, dial 911. Now available from your iPhone and Android! Please provide this summary of care documentation to your next provider. Lyme Disease Testing Disclaimer:   
 § 89.1-2884.1. (Expires July 1, 2018) Lyme disease testing information disclosure. A. Every licensee or his in-office designee who orders a laboratory test for the presence of Lyme disease shall provide to the patient or his legal representative the following written information: \"ACCORDING TO THE CENTERS FOR DISEASE CONTROL AND PREVENTION, AS OF 2011 LYME DISEASE IS THE SIXTH FASTEST GROWING DISEASE IN THE [place]. YOUR HEALTH CARE PROVIDER HAS ORDERED A LABORATORY TEST FOR THE PRESENCE OF LYME DISEASE FOR YOU. CURRENT LABORATORY TESTING FOR LYME DISEASE CAN BE PROBLEMATIC AND STANDARD LABORATORY TESTS OFTEN RESULT IN FALSE NEGATIVE AND FALSE POSITIVE RESULTS, AND IF DONE TOO EARLY, YOU MAY NOT HAVE PRODUCED ENOUGH ANTIBODIES TO BE CONSIDERED POSITIVE BECAUSE YOUR IMMUNE RESPONSE REQUIRES TIME TO DEVELOP ANTIBODIES. IF YOU ARE TESTED FOR LYME DISEASE, AND THE RESULTS ARE NEGATIVE, THIS DOES NOT NECESSARILY MEAN YOU DO NOT HAVE LYME DISEASE. IF YOU CONTINUE TO EXPERIENCE SYMPTOMS, YOU SHOULD CONTACT YOUR HEALTH CARE PROVIDER AND INQUIRE ABOUT THE APPROPRIATENESS OF RETESTING OR ADDITIONAL TREATMENT. \"  
B. Licensees shall be immune from civil liability for the provision of the written information required by this section absent gross negligence or willful misconduct. Your primary care clinician is listed as Debora Bragg. If you have any questions after today's visit, please call [phone].

## 2023-09-27 DIAGNOSIS — F41.8 OTHER SPECIFIED ANXIETY DISORDERS: Primary | ICD-10-CM

## 2023-09-28 RX ORDER — ZOLPIDEM TARTRATE 5 MG/1
TABLET ORAL
Qty: 10 TABLET | Refills: 0 | Status: SHIPPED | OUTPATIENT
Start: 2023-09-28 | End: 2023-10-08

## 2023-10-03 ENCOUNTER — OFFICE VISIT (OUTPATIENT)
Age: 78
End: 2023-10-03

## 2023-10-03 VITALS
BODY MASS INDEX: 21.88 KG/M2 | DIASTOLIC BLOOD PRESSURE: 72 MMHG | RESPIRATION RATE: 18 BRPM | TEMPERATURE: 98.4 F | OXYGEN SATURATION: 93 % | SYSTOLIC BLOOD PRESSURE: 124 MMHG | WEIGHT: 170.4 LBS | HEART RATE: 65 BPM

## 2023-10-03 DIAGNOSIS — R11.0 NAUSEA: ICD-10-CM

## 2023-10-03 DIAGNOSIS — K21.9 GASTROESOPHAGEAL REFLUX DISEASE, UNSPECIFIED WHETHER ESOPHAGITIS PRESENT: Primary | ICD-10-CM

## 2023-10-03 RX ORDER — ONDANSETRON 4 MG/1
4 TABLET, ORALLY DISINTEGRATING ORAL ONCE
Status: COMPLETED | OUTPATIENT
Start: 2023-10-03 | End: 2023-10-03

## 2023-10-03 RX ORDER — PANTOPRAZOLE SODIUM 20 MG/1
40 TABLET, DELAYED RELEASE ORAL DAILY
Qty: 60 TABLET | Refills: 1 | Status: SHIPPED | OUTPATIENT
Start: 2023-10-03

## 2023-10-03 RX ORDER — ONDANSETRON 4 MG/1
4 TABLET, FILM COATED ORAL EVERY 8 HOURS PRN
Qty: 30 TABLET | Refills: 0 | Status: SHIPPED | OUTPATIENT
Start: 2023-10-03 | End: 2023-10-13

## 2023-10-03 RX ADMIN — ONDANSETRON 4 MG: 4 TABLET, ORALLY DISINTEGRATING ORAL at 16:20

## 2023-10-03 ASSESSMENT — ENCOUNTER SYMPTOMS
FLATUS: 1
ABDOMINAL PAIN: 1

## 2023-10-03 NOTE — PROGRESS NOTES
Lap. and repair of incisional hernia with mesh    LAP,PROSTATECTOMY,RADICAL,W/NERVE SPARE,INCL ROBOTIC  09/2011    OTHER SURGICAL HISTORY      TUMOR REMOVED FROM SPINE AT [facility]    TX UNLISTED PROCEDURE ABDOMEN PERITONEUM & OMENTUM  2003    SPLENECTOMY    PROSTATECTOMY      REPAIR INCISIONAL HERNIA,REDUCIBLE  09/25/2011    diagnostic lap. wih mesh    UPPER GASTROINTESTINAL ENDOSCOPY  2018    UROLOGICAL SURGERY  2011    robotic prostatectomy    WISDOM TOOTH EXTRACTION         Prior to Admission medications    Medication Sig Start Date End Date Taking?  Authorizing Provider   zolpidem (AMBIEN) 5 MG tablet TAKE ONE TABLET BY MOUTH ONCE AT BEDTIME IF NEEDED FOR SLEEP 9/28/23 10/8/23 Yes Nena Collins MD   sertraline (ZOLOFT) 50 MG tablet TAKE 1 TABLET BY MOUTH  DAILY 7/7/23  Yes Nena Collins MD   atorvastatin (LIPITOR) 10 MG tablet Take by mouth daily 1/23/23  Yes Ar Automatic Reconciliation   Cetirizine HCl (ZYRTEC ALLERGY) 10 MG CAPS Take by mouth daily as needed   Yes Ar Automatic Reconciliation   cyanocobalamin 1000 MCG tablet Take by mouth daily   Yes Ar Automatic Reconciliation   hydrocortisone 2.5 % cream Apply topically 2 times daily 9/28/22  Yes Ar Automatic Reconciliation   Multiple Vitamins-Minerals (PRESERVISION/LUTEIN) CAPS Take 1 tablet by mouth 2 times daily   Yes Ar Automatic Reconciliation   prednisoLONE acetate (PRED FORTE) 1 % ophthalmic suspension ceived the following from [address] [facility]: Outside name: prednisoLONE acetate (PRED FORTE) 1 % ophthalmic suspension 12/6/22  Yes Ar Automatic Reconciliation   timolol (TIMOPTIC) 0.5 % ophthalmic solution ceived the following from [facility]: Outside name: timolol (TIMOPTIC) 0.5 % ophthalmic solution 12/23/22  Yes Ar Automatic Reconciliation   coenzyme Q10 100 MG CAPS capsule Take by mouth daily  Patient not taking: Reported on 10/3/2023    Ar Automatic Reconciliation   tacrolimus (PROTOPIC) 0.03 % ointment ceived the

## 2023-10-05 ASSESSMENT — ENCOUNTER SYMPTOMS: DIARRHEA: 1

## 2023-11-02 ENCOUNTER — PATIENT MESSAGE (OUTPATIENT)
Age: 78
End: 2023-11-02

## 2023-11-02 NOTE — TELEPHONE ENCOUNTER
From: Hugo Steen Sr  To: Dr. Iyer Basket: 11/2/2023 12:43 PM EDT  Subject: Flu Vaccination    For my records, I received the Fluzone HD Quad [id number] PFS injection On Nov 1,2023

## 2023-11-28 DIAGNOSIS — K21.9 GASTROESOPHAGEAL REFLUX DISEASE, UNSPECIFIED WHETHER ESOPHAGITIS PRESENT: ICD-10-CM

## 2023-12-14 ENCOUNTER — HOSPITAL ENCOUNTER (OUTPATIENT)
Facility: HOSPITAL | Age: 78
Discharge: HOME | End: 2023-12-14
Payer: MEDICARE

## 2023-12-14 DIAGNOSIS — M25.562 CHRONIC PAIN OF LEFT KNEE: ICD-10-CM

## 2023-12-14 DIAGNOSIS — M25.462 KNEE EFFUSION, LEFT: ICD-10-CM

## 2023-12-14 DIAGNOSIS — M71.22 SYNOVIAL CYST OF LEFT POPLITEAL SPACE: ICD-10-CM

## 2023-12-14 DIAGNOSIS — G89.29 CHRONIC PAIN OF LEFT KNEE: ICD-10-CM

## 2023-12-14 PROCEDURE — 73721 MRI JNT OF LWR EXTRE W/O DYE: CPT

## 2024-01-02 ENCOUNTER — OFFICE VISIT (OUTPATIENT)
Age: 79
End: 2024-01-02
Payer: MEDICARE

## 2024-01-02 VITALS
WEIGHT: 177.8 LBS | HEIGHT: 74 IN | DIASTOLIC BLOOD PRESSURE: 80 MMHG | HEART RATE: 66 BPM | SYSTOLIC BLOOD PRESSURE: 124 MMHG | BODY MASS INDEX: 22.82 KG/M2 | TEMPERATURE: 97.6 F | OXYGEN SATURATION: 97 % | RESPIRATION RATE: 14 BRPM

## 2024-01-02 DIAGNOSIS — Z79.899 HIGH RISK MEDICATION USE: ICD-10-CM

## 2024-01-02 DIAGNOSIS — F41.8 ANXIETY ASSOCIATED WITH DEPRESSION: ICD-10-CM

## 2024-01-02 DIAGNOSIS — Z00.00 MEDICARE ANNUAL WELLNESS VISIT, SUBSEQUENT: Primary | ICD-10-CM

## 2024-01-02 DIAGNOSIS — C61 MALIGNANT NEOPLASM OF PROSTATE (HCC): ICD-10-CM

## 2024-01-02 DIAGNOSIS — K21.00 GASTROESOPHAGEAL REFLUX DISEASE WITH ESOPHAGITIS WITHOUT HEMORRHAGE: ICD-10-CM

## 2024-01-02 DIAGNOSIS — I70.0 ATHEROSCLEROSIS OF AORTA (HCC): ICD-10-CM

## 2024-01-02 PROBLEM — F32.0 MAJOR DEPRESSIVE DISORDER, SINGLE EPISODE, MILD (HCC): Status: ACTIVE | Noted: 2024-01-02

## 2024-01-02 PROCEDURE — G8420 CALC BMI NORM PARAMETERS: HCPCS | Performed by: INTERNAL MEDICINE

## 2024-01-02 PROCEDURE — 99214 OFFICE O/P EST MOD 30 MIN: CPT | Performed by: INTERNAL MEDICINE

## 2024-01-02 PROCEDURE — G0439 PPPS, SUBSEQ VISIT: HCPCS | Performed by: INTERNAL MEDICINE

## 2024-01-02 PROCEDURE — 1036F TOBACCO NON-USER: CPT | Performed by: INTERNAL MEDICINE

## 2024-01-02 PROCEDURE — G8484 FLU IMMUNIZE NO ADMIN: HCPCS | Performed by: INTERNAL MEDICINE

## 2024-01-02 PROCEDURE — 1123F ACP DISCUSS/DSCN MKR DOCD: CPT | Performed by: INTERNAL MEDICINE

## 2024-01-02 PROCEDURE — G8427 DOCREV CUR MEDS BY ELIG CLIN: HCPCS | Performed by: INTERNAL MEDICINE

## 2024-01-02 RX ORDER — ZOLPIDEM TARTRATE 5 MG/1
5 TABLET ORAL NIGHTLY PRN
Qty: 30 TABLET | Refills: 0 | Status: SHIPPED | OUTPATIENT
Start: 2024-01-02 | End: 2024-02-01

## 2024-01-02 SDOH — ECONOMIC STABILITY: FOOD INSECURITY: WITHIN THE PAST 12 MONTHS, THE FOOD YOU BOUGHT JUST DIDN'T LAST AND YOU DIDN'T HAVE MONEY TO GET MORE.: PATIENT DECLINED

## 2024-01-02 SDOH — ECONOMIC STABILITY: FOOD INSECURITY: WITHIN THE PAST 12 MONTHS, YOU WORRIED THAT YOUR FOOD WOULD RUN OUT BEFORE YOU GOT MONEY TO BUY MORE.: PATIENT DECLINED

## 2024-01-02 SDOH — ECONOMIC STABILITY: INCOME INSECURITY: HOW HARD IS IT FOR YOU TO PAY FOR THE VERY BASICS LIKE FOOD, HOUSING, MEDICAL CARE, AND HEATING?: PATIENT DECLINED

## 2024-01-02 SDOH — ECONOMIC STABILITY: HOUSING INSECURITY
IN THE LAST 12 MONTHS, WAS THERE A TIME WHEN YOU DID NOT HAVE A STEADY PLACE TO SLEEP OR SLEPT IN A SHELTER (INCLUDING NOW)?: PATIENT DECLINED

## 2024-01-02 ASSESSMENT — PATIENT HEALTH QUESTIONNAIRE - PHQ9
9. THOUGHTS THAT YOU WOULD BE BETTER OFF DEAD, OR OF HURTING YOURSELF: 0
6. FEELING BAD ABOUT YOURSELF - OR THAT YOU ARE A FAILURE OR HAVE LET YOURSELF OR YOUR FAMILY DOWN: 0
SUM OF ALL RESPONSES TO PHQ9 QUESTIONS 1 & 2: 0
2. FEELING DOWN, DEPRESSED OR HOPELESS: 0
SUM OF ALL RESPONSES TO PHQ QUESTIONS 1-9: 4
4. FEELING TIRED OR HAVING LITTLE ENERGY: 1
SUM OF ALL RESPONSES TO PHQ QUESTIONS 1-9: 4
7. TROUBLE CONCENTRATING ON THINGS, SUCH AS READING THE NEWSPAPER OR WATCHING TELEVISION: 0
SUM OF ALL RESPONSES TO PHQ QUESTIONS 1-9: 4
5. POOR APPETITE OR OVEREATING: 1
1. LITTLE INTEREST OR PLEASURE IN DOING THINGS: 0
3. TROUBLE FALLING OR STAYING ASLEEP: 2
SUM OF ALL RESPONSES TO PHQ QUESTIONS 1-9: 4
8. MOVING OR SPEAKING SO SLOWLY THAT OTHER PEOPLE COULD HAVE NOTICED. OR THE OPPOSITE, BEING SO FIGETY OR RESTLESS THAT YOU HAVE BEEN MOVING AROUND A LOT MORE THAN USUAL: 0

## 2024-01-02 ASSESSMENT — LIFESTYLE VARIABLES
HOW MANY STANDARD DRINKS CONTAINING ALCOHOL DO YOU HAVE ON A TYPICAL DAY: 1 OR 2
HOW OFTEN DO YOU HAVE A DRINK CONTAINING ALCOHOL: MONTHLY OR LESS

## 2024-01-02 NOTE — PROGRESS NOTES
Medicare Annual Wellness Visit    Victoriano Carrera  is here for Annual Exam    Assessment & Plan   Medicare annual wellness visit, subsequent  Major depressive disorder, single episode, mild (HCC)  Assessment & Plan:   Asymptomatic, continue current medications  Orders:  -     ND OFFICE OUTPATIENT VISIT 25 MINUTES [63932]  Malignant neoplasm of prostate (HCC)  Assessment & Plan:   Asymptomatic, continue current medications  Atherosclerosis of aorta (HCC)  Assessment & Plan:   Asymptomatic, continue current medications  Orders:  -     CBC with Auto Differential; Future  -     Comprehensive Metabolic Panel; Future  -     Lipid Panel; Future  -     ND OFFICE OUTPATIENT VISIT 25 MINUTES [27677]  Anxiety associated with depression  -     zolpidem (AMBIEN) 5 MG tablet; Take 1 tablet by mouth nightly as needed for Sleep for up to 30 days. Max Daily Amount: 5 mg, Disp-30 tablet, R-0Normal  -     ND OFFICE OUTPATIENT VISIT 25 MINUTES [78970]  Gastroesophageal reflux disease with esophagitis without hemorrhage  -     CBC with Auto Differential; Future  -     ND OFFICE OUTPATIENT VISIT 25 MINUTES [26344]  High risk medication use  -     Magnesium; Future  -     ND OFFICE OUTPATIENT VISIT 25 MINUTES [96415]    Recommendations for Preventive Services Due: see orders and patient instructions/AVS.  Recommended screening schedule for the next 5-10 years is provided to the patient in written form: see Patient Instructions/AVS.     Return in 1 year (on 1/2/2025).     Subjective   The following acute and/or chronic problems were also addressed today:  Patient Active Problem List    Diagnosis Date Noted    Major depressive disorder, single episode, mild (HCC) 01/02/2024    Abdominal pain, periumbilical 02/06/2023    Ventral hernia without obstruction or gangrene 12/07/2020    Adenomatous polyp of ascending colon 12/05/2019    Mild depression 12/04/2018    Atherosclerosis of aorta (HCC) 04/18/2018    Prehypertension 11/28/2016

## 2024-01-02 NOTE — PATIENT INSTRUCTIONS
A Healthy Heart: Care Instructions  Your Care Instructions     Coronary artery disease, also called heart disease, occurs when a substance called plaque builds up in the vessels that supply oxygen-rich blood to your heart muscle. This can narrow the blood vessels and reduce blood flow. A heart attack happens when blood flow is completely blocked. A high-fat diet, smoking, and other factors increase the risk of heart disease.  Your doctor has found that you have a chance of having heart disease. You can do lots of things to keep your heart healthy. It may not be easy, but you can change your diet, exercise more, and quit smoking. These steps really work to lower your chance of heart disease.  Follow-up care is a key part of your treatment and safety. Be sure to make and go to all appointments, and call your doctor if you are having problems. It's also a good idea to know your test results and keep a list of the medicines you take.  How can you care for yourself at home?  Diet    Use less salt when you cook and eat. This helps lower your blood pressure. Taste food before salting. Add only a little salt when you think you need it. With time, your taste buds will adjust to less salt.     Eat fewer snack items, fast foods, canned soups, and other high-salt, high-fat, processed foods.     Read food labels and try to avoid saturated and trans fats. They increase your risk of heart disease by raising cholesterol levels.     Limit the amount of solid fat-butter, margarine, and shortening-you eat. Use olive, peanut, or canola oil when you cook. Bake, broil, and steam foods instead of frying them.     Eat a variety of fruit and vegetables every day. Dark green, deep orange, red, or yellow fruits and vegetables are especially good for you. Examples include spinach, carrots, peaches, and berries.     Foods high in fiber can reduce your cholesterol and provide important vitamins and minerals. High-fiber foods include

## 2024-01-03 DIAGNOSIS — I70.0 ATHEROSCLEROSIS OF AORTA (HCC): ICD-10-CM

## 2024-01-03 DIAGNOSIS — K21.00 GASTROESOPHAGEAL REFLUX DISEASE WITH ESOPHAGITIS WITHOUT HEMORRHAGE: ICD-10-CM

## 2024-01-03 DIAGNOSIS — Z79.899 HIGH RISK MEDICATION USE: ICD-10-CM

## 2024-01-03 LAB
ALBUMIN SERPL-MCNC: 3.5 G/DL (ref 3.5–5)
ALBUMIN/GLOB SERPL: 1.2 (ref 1.1–2.2)
ALP SERPL-CCNC: 114 U/L (ref 45–117)
ALT SERPL-CCNC: 24 U/L (ref 12–78)
ANION GAP SERPL CALC-SCNC: 2 MMOL/L (ref 5–15)
AST SERPL-CCNC: 20 U/L (ref 15–37)
BASOPHILS # BLD: 0.1 K/UL (ref 0–0.1)
BASOPHILS NFR BLD: 2 % (ref 0–1)
BILIRUB SERPL-MCNC: 0.7 MG/DL (ref 0.2–1)
BUN SERPL-MCNC: 20 MG/DL (ref 6–20)
BUN/CREAT SERPL: 22 (ref 12–20)
CALCIUM SERPL-MCNC: 8.5 MG/DL (ref 8.5–10.1)
CHLORIDE SERPL-SCNC: 105 MMOL/L (ref 97–108)
CHOLEST SERPL-MCNC: 168 MG/DL
CO2 SERPL-SCNC: 30 MMOL/L (ref 21–32)
CREAT SERPL-MCNC: 0.91 MG/DL (ref 0.7–1.3)
DIFFERENTIAL METHOD BLD: ABNORMAL
EOSINOPHIL # BLD: 0.6 K/UL (ref 0–0.4)
EOSINOPHIL NFR BLD: 7 % (ref 0–7)
ERYTHROCYTE [DISTWIDTH] IN BLOOD BY AUTOMATED COUNT: 13.2 % (ref 11.5–14.5)
GLOBULIN SER CALC-MCNC: 2.9 G/DL (ref 2–4)
GLUCOSE SERPL-MCNC: 105 MG/DL (ref 65–100)
HCT VFR BLD AUTO: 41.6 % (ref 36.6–50.3)
HDLC SERPL-MCNC: 61 MG/DL
HDLC SERPL: 2.8 (ref 0–5)
HGB BLD-MCNC: 14.2 G/DL (ref 12.1–17)
IMM GRANULOCYTES # BLD AUTO: 0 K/UL (ref 0–0.04)
IMM GRANULOCYTES NFR BLD AUTO: 0 % (ref 0–0.5)
LDLC SERPL CALC-MCNC: 90 MG/DL (ref 0–100)
LYMPHOCYTES # BLD: 2.9 K/UL (ref 0.8–3.5)
LYMPHOCYTES NFR BLD: 36 % (ref 12–49)
MAGNESIUM SERPL-MCNC: 2.2 MG/DL (ref 1.6–2.4)
MCH RBC QN AUTO: 33.3 PG (ref 26–34)
MCHC RBC AUTO-ENTMCNC: 34.1 G/DL (ref 30–36.5)
MCV RBC AUTO: 97.4 FL (ref 80–99)
MONOCYTES # BLD: 0.8 K/UL (ref 0–1)
MONOCYTES NFR BLD: 10 % (ref 5–13)
NEUTS SEG # BLD: 3.5 K/UL (ref 1.8–8)
NEUTS SEG NFR BLD: 45 % (ref 32–75)
NRBC # BLD: 0 K/UL (ref 0–0.01)
NRBC BLD-RTO: 0 PER 100 WBC
PLATELET # BLD AUTO: 298 K/UL (ref 150–400)
PMV BLD AUTO: 9.8 FL (ref 8.9–12.9)
POTASSIUM SERPL-SCNC: 4.3 MMOL/L (ref 3.5–5.1)
PROT SERPL-MCNC: 6.4 G/DL (ref 6.4–8.2)
RBC # BLD AUTO: 4.27 M/UL (ref 4.1–5.7)
SODIUM SERPL-SCNC: 137 MMOL/L (ref 136–145)
TRIGL SERPL-MCNC: 85 MG/DL
VLDLC SERPL CALC-MCNC: 17 MG/DL
WBC # BLD AUTO: 7.9 K/UL (ref 4.1–11.1)

## 2024-01-05 RX ORDER — ATORVASTATIN CALCIUM 10 MG/1
10 TABLET, FILM COATED ORAL DAILY
Qty: 90 TABLET | Refills: 3 | Status: SHIPPED | OUTPATIENT
Start: 2024-01-05

## 2024-01-30 RX ORDER — PANTOPRAZOLE SODIUM 20 MG/1
40 TABLET, DELAYED RELEASE ORAL DAILY
Qty: 60 TABLET | Refills: 1 | OUTPATIENT
Start: 2024-01-30

## 2024-02-14 NOTE — ASSESSMENT & PLAN NOTE
Asymptomatic, continue current medications   Assessment and Plan    POD#1 CE OS.  Patient doing well.  Surgery healing appropriately.  Post op instructions reviewed.  Patient given calendar with drop schedule.        Electronically signed by  Dale Finney MD  02/14/24       Chief Complaint   Patient presents with    Office Visit        HPI    This is a 70 year old established patient here for a 1 day cataract surgery post op of the left eye. Patient is doing well.          Patient Active Problem List   Diagnosis    Mixed diabetic hyperlipidemia associated with type 2 diabetes mellitus (CMD)    Hypertension complicating diabetes (CMD)    Diabetic cataract (CMD)       Current Outpatient Medications   Medication Sig Dispense Refill    prednisoLONE acetate (PRED FORTE) 1 % ophthalmic suspension Put one drop in the right eye four times a day for 14 days then twice a day for 14 days. 5 mL 1    ofloxacin (OCUFLOX) 0.3 % ophthalmic solution Instill 1 drop 4 times per day into the left eye starting 3 days prior to surgery.  After surgery follow post op instructions. 5 mL 0    blood glucose (Accu-Chek Guide) test strip Test blood sugar 1 times daily. Diagnosis: E11.65. Meter: Accu-chek Guide 100 each 3    Lancets (accu-chek multiclix) Misc Test blood sugar 1 times daily. Diagnosis: E11.65. Meter: Accu-chek Guide 100 each 3    metFORMIN (GLUCOPHAGE-XR) 500 MG 24 hr tablet Take 1 tablet by mouth 2 times daily (with meals). 180 tablet 3    losartan (COZAAR) 50 MG tablet Take 1 tablet by mouth daily. 90 tablet 3    atorvastatin (LIPITOR) 10 MG tablet Take 1 tablet by mouth nightly. 90 tablet 3    Aspirin 81 MG Cap Take 1 capsule by mouth daily. 90 capsule 3     No current facility-administered medications for this visit.     Facility-Administered Medications Ordered in Other Visits   Medication Dose Route Frequency Provider Last Rate Last Admin    fentaNYL (SUBLIMAZE) injection 25 mcg  25 mcg Intravenous Q5 Min PRN Umberto Brooks MD        HYDROmorphone (DILAUDID)  injection 0.2 mg  0.2 mg Intravenous Q5 Min PRN Umberto Brooks MD        morphine injection 2 mg  2 mg Intravenous Q5 Min PRN Umberto Brooks MD        sodium chloride 0.9% infusion   Intravenous Continuous Umberto Brooks MD        lactated ringers infusion   Intravenous Continuous Umberto Brooks MD        dextrose (GLUTOSE) 40 % gel 30 g  30 g Oral Once PRN Umberto Brooks MD        dextrose 50 % injection 25 g  25 g Intravenous PRN Umberto Brooks MD        insulin regular (human) (HumuLIN R, NovoLIN R) sliding scale injection   Subcutaneous Once PRN Umberto Brooks MD        sodium chloride 0.9 % flush bag 25 mL  25 mL Intravenous PRN Umberto Brooks MD        sodium chloride 0.9 % injection 2 mL  2 mL Intracatheter 2 times per day Umberto Brooks MD        lactated ringers infusion   Intravenous Continuous Umberto Brooks MD 10 mL/hr at 02/13/24 1138 Restarted at 02/13/24 1329    sodium chloride 0.9% infusion   Intravenous Continuous Umberto Brooks MD        dextrose 5 % infusion   Intravenous Continuous PRN Umberto Brooks MD        proparacaine (ALCAINE) 0.5 % ophthalmic solution 1 drop  1 drop Left Eye See Admin Instructions Dale Finney MD   1 drop at 02/13/24 1239    balanced salt (BSS) intraocular solution    PRN Dale Finney MD   500 mL at 02/13/24 1301    sodium hyaluronATE 3%/sodium chondroitin 4% (DUOVISC) 0.55-0.5 ML Kit    PRN Dale Finney MD   1 each at 02/13/24 1302    lidocaine-phenylephrine 1-1.5 % ophthalmic solution    PRN Dale Finney MD   0.3 mL at 02/13/24 1302    moxifloxacin 0.16 % (1.6 mg/mL) ophthalmic injection    PRN Dale Finney MD   0.3 mL at 02/13/24 1302    neomycin-polymyxin B-dexamethasone (MAXITROL) 3.5-04782-1.1 ophthalmic ointment    PRN Dale Finney MD   1 inch at 02/13/24 1302    sterile water for irrigation solution    PRN Dale Finney MD   50 mL at 02/13/24 1302       ED Diagnosis   1. Pseudophakia, left eye                Base Eye Exam       Visual Acuity (Snellen - Linear)         Right Left    Dist sc  20/100    Dist ph sc  20/50 -1              Tonometry (I Care, 11:06 AM)         Right Left    Pressure 17 12

## 2024-02-26 ENCOUNTER — TELEPHONE (OUTPATIENT)
Age: 79
End: 2024-02-26

## 2024-04-04 DIAGNOSIS — F41.8 ANXIETY ASSOCIATED WITH DEPRESSION: ICD-10-CM

## 2024-04-04 RX ORDER — ZOLPIDEM TARTRATE 5 MG/1
TABLET ORAL
Qty: 10 TABLET | OUTPATIENT
Start: 2024-04-04

## 2024-04-04 RX ORDER — ZOLPIDEM TARTRATE 5 MG/1
5 TABLET ORAL NIGHTLY PRN
COMMUNITY
End: 2024-04-04 | Stop reason: SDUPTHER

## 2024-04-04 NOTE — TELEPHONE ENCOUNTER
Refill request received from [facility] for   Requested Prescriptions     Pending Prescriptions Disp Refills    zolpidem (AMBIEN) 5 MG tablet       Sig: Take 1 tablet by mouth nightly as needed for Sleep. Max Daily Amount: 5 mg     Refused Prescriptions Disp Refills    zolpidem (AMBIEN) 5 MG tablet [Pharmacy Med Name: ZOLPIDEM TARTRATE 5 MG TABLET] 10 tablet      Sig: TAKE ONE TABLET BY MOUTH ONCE NIGHTLY AS NEEDED FOR SLEEP FOR UP TO THIRTY DAYS  **MAXIMUM DAILY AMOUNT IS ONE TABLET**     Last office visit: 1/2/2024   Next office visit: Visit date not found     Routed to Dr Robbie Mullen for review.

## 2024-04-05 RX ORDER — ZOLPIDEM TARTRATE 5 MG/1
5 TABLET ORAL NIGHTLY PRN
Qty: 10 TABLET | Refills: 0 | Status: SHIPPED | OUTPATIENT
Start: 2024-04-05 | End: 2024-06-04

## 2024-06-24 NOTE — TELEPHONE ENCOUNTER
Refill request received from [facility] for   Requested Prescriptions     Pending Prescriptions Disp Refills    sertraline (ZOLOFT) 50 MG tablet [Pharmacy Med Name: Sertraline HCl 50 MG Oral Tablet] 90 tablet 3     Sig: TAKE 1 TABLET BY MOUTH DAILY     Last office visit: 1/2/2024   Next office visit: Visit date not found     Routed to Dr Robbie Mullen for review.

## 2024-11-25 SDOH — HEALTH STABILITY: PHYSICAL HEALTH: ON AVERAGE, HOW MANY DAYS PER WEEK DO YOU ENGAGE IN MODERATE TO STRENUOUS EXERCISE (LIKE A BRISK WALK)?: 5 DAYS

## 2024-11-25 SDOH — HEALTH STABILITY: PHYSICAL HEALTH: ON AVERAGE, HOW MANY MINUTES DO YOU ENGAGE IN EXERCISE AT THIS LEVEL?: 30 MIN

## 2024-11-26 ENCOUNTER — OFFICE VISIT (OUTPATIENT)
Facility: CLINIC | Age: 79
End: 2024-11-26
Payer: MEDICARE

## 2024-11-26 VITALS
RESPIRATION RATE: 20 BRPM | DIASTOLIC BLOOD PRESSURE: 88 MMHG | HEIGHT: 74 IN | SYSTOLIC BLOOD PRESSURE: 134 MMHG | BODY MASS INDEX: 22.07 KG/M2 | OXYGEN SATURATION: 98 % | TEMPERATURE: 99.5 F | WEIGHT: 172 LBS | HEART RATE: 69 BPM

## 2024-11-26 DIAGNOSIS — R06.09 EXERTIONAL DYSPNEA: ICD-10-CM

## 2024-11-26 DIAGNOSIS — F51.01 PRIMARY INSOMNIA: Primary | ICD-10-CM

## 2024-11-26 DIAGNOSIS — E61.1 IRON DEFICIENCY: ICD-10-CM

## 2024-11-26 DIAGNOSIS — K21.9 GASTROESOPHAGEAL REFLUX DISEASE WITHOUT ESOPHAGITIS: ICD-10-CM

## 2024-11-26 DIAGNOSIS — Z90.81 S/P SPLENECTOMY: ICD-10-CM

## 2024-11-26 DIAGNOSIS — E78.5 DYSLIPIDEMIA, GOAL LDL BELOW 100: ICD-10-CM

## 2024-11-26 DIAGNOSIS — R73.01 IMPAIRED FASTING GLUCOSE: ICD-10-CM

## 2024-11-26 DIAGNOSIS — K42.9 PERIUMBILICAL HERNIA: ICD-10-CM

## 2024-11-26 DIAGNOSIS — F41.8 ANXIETY ASSOCIATED WITH DEPRESSION: ICD-10-CM

## 2024-11-26 DIAGNOSIS — R53.82 CHRONIC FATIGUE: ICD-10-CM

## 2024-11-26 DIAGNOSIS — E55.9 VITAMIN D DEFICIENCY: ICD-10-CM

## 2024-11-26 PROBLEM — F32.A MILD DEPRESSION: Status: RESOLVED | Noted: 2018-12-04 | Resolved: 2024-11-26

## 2024-11-26 PROBLEM — F32.0 MAJOR DEPRESSIVE DISORDER, SINGLE EPISODE, MILD (HCC): Status: RESOLVED | Noted: 2024-01-02 | Resolved: 2024-11-26

## 2024-11-26 PROCEDURE — G8420 CALC BMI NORM PARAMETERS: HCPCS | Performed by: FAMILY MEDICINE

## 2024-11-26 PROCEDURE — 1036F TOBACCO NON-USER: CPT | Performed by: FAMILY MEDICINE

## 2024-11-26 PROCEDURE — G8428 CUR MEDS NOT DOCUMENT: HCPCS | Performed by: FAMILY MEDICINE

## 2024-11-26 PROCEDURE — 99204 OFFICE O/P NEW MOD 45 MIN: CPT | Performed by: FAMILY MEDICINE

## 2024-11-26 PROCEDURE — 1123F ACP DISCUSS/DSCN MKR DOCD: CPT | Performed by: FAMILY MEDICINE

## 2024-11-26 PROCEDURE — G8484 FLU IMMUNIZE NO ADMIN: HCPCS | Performed by: FAMILY MEDICINE

## 2024-11-26 PROCEDURE — 1126F AMNT PAIN NOTED NONE PRSNT: CPT | Performed by: FAMILY MEDICINE

## 2024-11-26 RX ORDER — ZOLPIDEM TARTRATE 5 MG/1
5 TABLET ORAL NIGHTLY PRN
Qty: 30 TABLET | Refills: 2 | Status: SHIPPED | OUTPATIENT
Start: 2024-11-26 | End: 2025-02-24

## 2024-11-26 SDOH — ECONOMIC STABILITY: FOOD INSECURITY: WITHIN THE PAST 12 MONTHS, YOU WORRIED THAT YOUR FOOD WOULD RUN OUT BEFORE YOU GOT MONEY TO BUY MORE.: NEVER TRUE

## 2024-11-26 SDOH — ECONOMIC STABILITY: INCOME INSECURITY: HOW HARD IS IT FOR YOU TO PAY FOR THE VERY BASICS LIKE FOOD, HOUSING, MEDICAL CARE, AND HEATING?: NOT HARD AT ALL

## 2024-11-26 SDOH — ECONOMIC STABILITY: FOOD INSECURITY: WITHIN THE PAST 12 MONTHS, THE FOOD YOU BOUGHT JUST DIDN'T LAST AND YOU DIDN'T HAVE MONEY TO GET MORE.: NEVER TRUE

## 2024-11-26 ASSESSMENT — PATIENT HEALTH QUESTIONNAIRE - PHQ9
7. TROUBLE CONCENTRATING ON THINGS, SUCH AS READING THE NEWSPAPER OR WATCHING TELEVISION: NOT AT ALL
5. POOR APPETITE OR OVEREATING: NOT AT ALL
6. FEELING BAD ABOUT YOURSELF - OR THAT YOU ARE A FAILURE OR HAVE LET YOURSELF OR YOUR FAMILY DOWN: NOT AT ALL
SUM OF ALL RESPONSES TO PHQ QUESTIONS 1-9: 0
1. LITTLE INTEREST OR PLEASURE IN DOING THINGS: NOT AT ALL
4. FEELING TIRED OR HAVING LITTLE ENERGY: NOT AT ALL
2. FEELING DOWN, DEPRESSED OR HOPELESS: NOT AT ALL
8. MOVING OR SPEAKING SO SLOWLY THAT OTHER PEOPLE COULD HAVE NOTICED. OR THE OPPOSITE, BEING SO FIGETY OR RESTLESS THAT YOU HAVE BEEN MOVING AROUND A LOT MORE THAN USUAL: NOT AT ALL
10. IF YOU CHECKED OFF ANY PROBLEMS, HOW DIFFICULT HAVE THESE PROBLEMS MADE IT FOR YOU TO DO YOUR WORK, TAKE CARE OF THINGS AT HOME, OR GET ALONG WITH OTHER PEOPLE: NOT DIFFICULT AT ALL
SUM OF ALL RESPONSES TO PHQ QUESTIONS 1-9: 0
SUM OF ALL RESPONSES TO PHQ9 QUESTIONS 1 & 2: 0
SUM OF ALL RESPONSES TO PHQ QUESTIONS 1-9: 0
SUM OF ALL RESPONSES TO PHQ QUESTIONS 1-9: 0
9. THOUGHTS THAT YOU WOULD BE BETTER OFF DEAD, OR OF HURTING YOURSELF: NOT AT ALL
3. TROUBLE FALLING OR STAYING ASLEEP: NOT AT ALL

## 2024-11-26 NOTE — PROGRESS NOTES
Chief Complaint   Patient presents with    Establish Care     No Concerns      \"Have you been to the ER, urgent care clinic since your last visit?  Hospitalized since your last visit?\"    NO    “Have you seen or consulted any other health care providers outside of [facility] since your last visit?”    NO          Click Here for Release of Records Request

## 2024-11-26 NOTE — PATIENT INSTRUCTIONS
etc. And when we only eat them for these situations, it's called Carbohydrate cycling (you reserve carbohydrate consumption primarily for workout days, with overall carb decrease for the entire week). Moving the remaining carb intake primarily to workout days maximizes your physical energy, provides an incentive for workouts, and cycles your metabolism to a more fat-burning mode on your off-days.    Another way to help cycle and \"reset\" your metabolism is to do intermittent fasting. It is acceptable and helpful (16, 24, or 36 hours in a row) for \"reseting\" your metabolism by allowing it to temporarily cycle to a fat-burning mode; you must make sure to drink lots of water and electrolytes (sugar-free), and that first meal after the fasting must be low carb. Consider looking into Fletcher Hadley MD (https://www.ifm.org/about/profile/[email or web address]/).    Healthy Fats:  Fried oils, in particular fried polyunsaturated vegetable oils used in frying different fried food has been shown to increase inflammation and may be contributing a lot to cholesterol problems in many adults. This fats become oxidized, which makes them more prone to cause inflammation and injury to your cells and artery walls.  Mediterranean Diet is well balanced with lean meats, healthy monounsaturated fats and nonoxidized unsaturated fats, high fiber carbs that your body needs.  (See education handout)    [employer] [facility] is agreat place to go and learn about good olive oil:  [address], [place] [address]     Fiber:  High Fiber with water increase helps regulate gut microbiome and intestinal health, which boosts the metabolism.  (See education handout)    Probiotics:  A healthy gut needs good tenants. If looking to increase your gut microbe diversity, consider temporary or intermittent supplementation with multiple natural gut bacteria. They are still researching what species perform which functions in the gut, but generally the more

## 2024-11-26 NOTE — PROGRESS NOTES
Assessment & Plan  1. Chronic fatigue.  The chronic fatigue appears to be a multifaceted issue, potentially resulting from deconditioning due to previous COVID-19 infections. The timeline does not suggest a correlation with his cancer history. A comprehensive set of labs will be ordered to evaluate blood counts, liver function, kidney function, electrolytes, thyroid function, testosterone levels, iron levels, vitamin D, B12, and folate. An exercise stress test will be conducted to assess exertional symptoms. He has been provided with reading material on dietary and physical activity habits. He is advised to monitor for any side effects of Lipitor and to take a week's break from it if necessary. He is also advised to engage in mild abdominal exercises and to use a hernia belt for additional abdominal wall support during physical activity.    2. Prostate cancer.  He has been cancer-free since 2015 and follows up with his urologist once a year in January. No additional PSA test is needed as his urologist monitors this.    3. Umbilical hernia.  He reports a pinching sensation, which could be due to scar tissue adhesion. He is advised to monitor the hernia for any growth or signs of incarceration. If symptoms worsen, he may consider elective surgery. An abdominal exam will be performed to ensure there are no concerning findings.    4. Insomnia.  He has a long-standing history of insomnia and has been using Ambien 5 mg as needed. He is advised to practice good sleep hygiene and to monitor for any signs of sleep apnea. A prescription for Ambien 5 mg will be renewed and sent to [facility]. If symptoms persist, a sleep study may be considered.    5. Anxiety and depression.  He is currently on sertraline (Zoloft) 50 mg daily, which helps manage his anxiety and mood. No changes to his medication regimen are needed at this time.    6. Hyperlipidemia.  He is on atorvastatin (Lipitor) 10 mg daily, which has

## 2024-12-05 ENCOUNTER — HOSPITAL ENCOUNTER (OUTPATIENT)
Facility: HOSPITAL | Age: 79
Discharge: HOME | End: 2024-12-07
Attending: FAMILY MEDICINE
Payer: MEDICARE

## 2024-12-05 VITALS
HEART RATE: 75 BPM | HEIGHT: 74 IN | BODY MASS INDEX: 22.07 KG/M2 | DIASTOLIC BLOOD PRESSURE: 92 MMHG | RESPIRATION RATE: 18 BRPM | SYSTOLIC BLOOD PRESSURE: 140 MMHG | WEIGHT: 172 LBS

## 2024-12-05 DIAGNOSIS — R06.09 EXERTIONAL DYSPNEA: ICD-10-CM

## 2024-12-05 LAB
ECHO BSA: 2.02 M2
STRESS ANGINA INDEX: 0
STRESS BASELINE DIAS BP: 92 MMHG
STRESS BASELINE HR: 67 BPM
STRESS BASELINE SYS BP: 140 MMHG
STRESS ESTIMATED WORKLOAD: 13.1 METS
STRESS EXERCISE DUR MIN: 12 MIN
STRESS EXERCISE DUR SEC: 0 SEC
STRESS PEAK DIAS BP: 90 MMHG
STRESS PEAK SYS BP: 160 MMHG
STRESS PERCENT HR ACHIEVED: 94 %
STRESS POST PEAK HR: 134 BPM
STRESS RATE PRESSURE PRODUCT: NORMAL BPM*MMHG
STRESS TARGET HR: 143 BPM

## 2024-12-05 PROCEDURE — 93017 CV STRESS TEST TRACING ONLY: CPT

## 2024-12-06 DIAGNOSIS — E61.1 IRON DEFICIENCY: ICD-10-CM

## 2024-12-06 DIAGNOSIS — R73.01 IMPAIRED FASTING GLUCOSE: ICD-10-CM

## 2024-12-06 DIAGNOSIS — E55.9 VITAMIN D DEFICIENCY: ICD-10-CM

## 2024-12-06 DIAGNOSIS — E78.5 DYSLIPIDEMIA, GOAL LDL BELOW 100: ICD-10-CM

## 2024-12-06 DIAGNOSIS — R53.82 CHRONIC FATIGUE: ICD-10-CM

## 2024-12-06 DIAGNOSIS — R74.8 ELEVATED ALKALINE PHOSPHATASE LEVEL: Primary | ICD-10-CM

## 2024-12-07 LAB
25(OH)D3 SERPL-MCNC: 35.5 NG/ML (ref 30–100)
ALBUMIN SERPL-MCNC: 3.6 G/DL (ref 3.5–5)
ALBUMIN/GLOB SERPL: 1.2 (ref 1.1–2.2)
ALP SERPL-CCNC: 122 U/L (ref 45–117)
ALT SERPL-CCNC: 18 U/L (ref 12–78)
ANION GAP SERPL CALC-SCNC: 2 MMOL/L (ref 2–12)
AST SERPL-CCNC: 13 U/L (ref 15–37)
BASOPHILS # BLD: 0.1 K/UL (ref 0–0.1)
BASOPHILS NFR BLD: 2 % (ref 0–1)
BILIRUB SERPL-MCNC: 0.6 MG/DL (ref 0.2–1)
BUN SERPL-MCNC: 20 MG/DL (ref 6–20)
BUN/CREAT SERPL: 23 (ref 12–20)
CALCIUM SERPL-MCNC: 9 MG/DL (ref 8.5–10.1)
CHLORIDE SERPL-SCNC: 106 MMOL/L (ref 97–108)
CHOLEST SERPL-MCNC: 176 MG/DL (ref 100–199)
CO2 SERPL-SCNC: 30 MMOL/L (ref 21–32)
CREAT SERPL-MCNC: 0.88 MG/DL (ref 0.7–1.3)
DIFFERENTIAL METHOD BLD: ABNORMAL
EOSINOPHIL # BLD: 0.3 K/UL (ref 0–0.4)
EOSINOPHIL NFR BLD: 5 % (ref 0–7)
ERYTHROCYTE [DISTWIDTH] IN BLOOD BY AUTOMATED COUNT: 12.8 % (ref 11.5–14.5)
EST. AVERAGE GLUCOSE BLD GHB EST-MCNC: 105 MG/DL
FERRITIN SERPL-MCNC: 319 NG/ML (ref 26–388)
FOLATE SERPL-MCNC: 18 NG/ML (ref 5–21)
GLOBULIN SER CALC-MCNC: 2.9 G/DL (ref 2–4)
GLUCOSE SERPL-MCNC: 106 MG/DL (ref 65–100)
HBA1C MFR BLD: 5.3 % (ref 4–5.6)
HCT VFR BLD AUTO: 44.7 % (ref 36.6–50.3)
HDL SERPL-SCNC: 40.7 UMOL/L
HDLC SERPL-MCNC: 61 MG/DL
HGB BLD-MCNC: 14.8 G/DL (ref 12.1–17)
IMM GRANULOCYTES # BLD AUTO: 0 K/UL (ref 0–0.04)
IMM GRANULOCYTES NFR BLD AUTO: 0 % (ref 0–0.5)
LDL SERPL QN: 21.5 NM
LDL SERPL-SCNC: 964 NMOL/L
LDL SMALL SERPL-SCNC: 323 NMOL/L
LDLC SERPL CALC-MCNC: 97 MG/DL (ref 0–99)
LP-IR SCORE SERPL: 41
LYMPHOCYTES # BLD: 2.2 K/UL (ref 0.8–3.5)
LYMPHOCYTES NFR BLD: 34 % (ref 12–49)
MCH RBC QN AUTO: 32.8 PG (ref 26–34)
MCHC RBC AUTO-ENTMCNC: 33.1 G/DL (ref 30–36.5)
MCV RBC AUTO: 99.1 FL (ref 80–99)
MONOCYTES # BLD: 0.6 K/UL (ref 0–1)
MONOCYTES NFR BLD: 9 % (ref 5–13)
NEUTS SEG # BLD: 3.3 K/UL (ref 1.8–8)
NEUTS SEG NFR BLD: 50 % (ref 32–75)
NRBC # BLD: 0 K/UL (ref 0–0.01)
NRBC BLD-RTO: 0 PER 100 WBC
PLATELET # BLD AUTO: 301 K/UL (ref 150–400)
PMV BLD AUTO: 9.9 FL (ref 8.9–12.9)
POTASSIUM SERPL-SCNC: 4.6 MMOL/L (ref 3.5–5.1)
PROT SERPL-MCNC: 6.5 G/DL (ref 6.4–8.2)
RBC # BLD AUTO: 4.51 M/UL (ref 4.1–5.7)
SODIUM SERPL-SCNC: 138 MMOL/L (ref 136–145)
TRIGL SERPL-MCNC: 98 MG/DL (ref 0–149)
TSH SERPL DL<=0.05 MIU/L-ACNC: 1.68 UIU/ML (ref 0.45–4.5)
VIT B12 SERPL-MCNC: 526 PG/ML (ref 193–986)
WBC # BLD AUTO: 6.6 K/UL (ref 4.1–11.1)

## 2024-12-08 LAB
TESTOST FREE SERPL-MCNC: 8 PG/ML (ref 6.6–18.1)
TESTOST SERPL-MCNC: 575 NG/DL (ref 264–916)

## 2024-12-11 ENCOUNTER — PATIENT MESSAGE (OUTPATIENT)
Facility: CLINIC | Age: 79
End: 2024-12-11

## 2025-01-06 ENCOUNTER — PATIENT MESSAGE (OUTPATIENT)
Facility: CLINIC | Age: 80
End: 2025-01-06

## 2025-01-14 DIAGNOSIS — R74.8 ELEVATED ALKALINE PHOSPHATASE LEVEL: ICD-10-CM

## 2025-01-15 LAB — GGT SERPL-CCNC: 28 U/L (ref 15–85)

## 2025-01-24 LAB
ALP BONE CFR SERPL: 49 % (ref 12–68)
ALP INTEST CFR SERPL: 3 % (ref 0–18)
ALP LIVER CFR SERPL: 48 % (ref 13–88)
ALP SERPL-CCNC: 117 IU/L (ref 44–121)

## 2025-02-24 RX ORDER — ATORVASTATIN CALCIUM 10 MG/1
10 TABLET, FILM COATED ORAL DAILY
Qty: 90 TABLET | Refills: 3 | Status: SHIPPED | OUTPATIENT
Start: 2025-02-24 | End: 2025-02-26

## 2025-02-26 RX ORDER — ATORVASTATIN CALCIUM 10 MG/1
10 TABLET, FILM COATED ORAL DAILY
Qty: 90 TABLET | Refills: 3 | Status: SHIPPED | OUTPATIENT
Start: 2025-02-26

## (undated) DEVICE — HYPODERMIC SAFETY NEEDLE: Brand: MAGELLAN

## (undated) DEVICE — BINDER ABD M/L 12X46-62IN --

## (undated) DEVICE — PACK,BASIC,SIRUS,V: Brand: MEDLINE

## (undated) DEVICE — GENERAL LAPAROSCOPY - SMH: Brand: MEDLINE INDUSTRIES, INC.

## (undated) DEVICE — TOTAL TRAY, DB, 100% SILI FOLEY, 16FR 10: Brand: MEDLINE

## (undated) DEVICE — DRAPE,REIN 53X77,STERILE: Brand: MEDLINE

## (undated) DEVICE — TBG INSUFFLATION LUER LOCK: Brand: MEDLINE INDUSTRIES, INC.

## (undated) DEVICE — SEALER LAP L37CM MARYLAND JAW OPN NANO COAT MULTIFUNCTIONAL

## (undated) DEVICE — SUTURE VCRL SZ 2-0 L27IN ABSRB VLT L26MM SH 1/2 CIR J317H

## (undated) DEVICE — BLADELESS OBTURATOR: Brand: WECK VISTA

## (undated) DEVICE — SHEARS ENDOSCP L36CM DIA5MM ULTRASONIC CRV TIP W/ ADV

## (undated) DEVICE — BINDER ABD S/M 12X30-45IN --

## (undated) DEVICE — VISUALIZATION SYSTEM: Brand: CLEARIFY

## (undated) DEVICE — TROCAR ENDOSCP SHFT L150MM DIA12MM BLDELSS ENDOPATH XCEL

## (undated) DEVICE — SUTURE VCRL SZ 3-0 L27IN ABSRB VLT L26MM SH 1/2 CIR J316H

## (undated) DEVICE — SUTURE MCRYL SZ 4-0 L27IN ABSRB UD L19MM PS-2 1/2 CIR PRIM Y426H

## (undated) DEVICE — ADHESIVE SKIN CLSR 0.7ML TOP DERMBND ADV

## (undated) DEVICE — GARMENT,MEDLINE,DVT,INT,CALF,MED, GEN2: Brand: MEDLINE

## (undated) DEVICE — GLOVE ORTHO 7   MSG9470

## (undated) DEVICE — SUTURE N ABSRB MONOFILAMENT 0 GS-22 9 IN BLU V-LOC PBT VLOCN2146

## (undated) DEVICE — SOLUTION IRRIG 1000ML 0.9% SOD CHL USP POUR PLAS BTL

## (undated) DEVICE — REM POLYHESIVE ADULT PATIENT RETURN ELECTRODE: Brand: VALLEYLAB

## (undated) DEVICE — COVER MPLR TIP CRV SCIS ACC DA VINCI

## (undated) DEVICE — ARM DRAPE

## (undated) DEVICE — GLOVE SURG SZ 75 L1212IN FNGR THK138MIL BRN LTX FREE

## (undated) DEVICE — Device

## (undated) DEVICE — SUTURE DEV SZ 2-0 WND CLSR ABSRB GS-22 VLOC COVIDIEN VLOCM2145

## (undated) DEVICE — LAPAROSCOPIC TROCAR SLEEVE/SINGLE USE: Brand: KII® OPTICAL ACCESS SYSTEM

## (undated) DEVICE — SEAL UNIV 5-8MM DISP BX/10 -- DA VINCI XI - SNGL USE

## (undated) DEVICE — AGENT HEMSTAT W2XL14IN OXIDIZED REGENERATED CELOS ABSRB FOR

## (undated) DEVICE — SUTURE N ABSRB MONOFILAMENT 0 GS-22 6 IN BLU V-LOC PBT VLOCN2106

## (undated) DEVICE — VESSEL SEALER XI EXTENDED DISP -- VESSEL

## (undated) DEVICE — DERMABOND SKIN ADH 0.7ML -- DERMABOND ADVANCED 12/BX

## (undated) DEVICE — SYSTEM EVAC SMOKE LAPARSCOPIC

## (undated) DEVICE — SUTURE ETHBND EXCEL SZ 0 L18IN NONABSORBABLE GRN L36MM CT-1 CX21D

## (undated) DEVICE — REDUCER CANN ENDOWRIST 12-8MM -- DA VINCI XI - SNGL USE